# Patient Record
Sex: FEMALE | Race: OTHER | NOT HISPANIC OR LATINO | Employment: FULL TIME | URBAN - METROPOLITAN AREA
[De-identification: names, ages, dates, MRNs, and addresses within clinical notes are randomized per-mention and may not be internally consistent; named-entity substitution may affect disease eponyms.]

---

## 2019-03-20 DIAGNOSIS — S63.521D SPRAIN OF RADIOCARPAL JOINT OF RIGHT WRIST, SUBSEQUENT ENCOUNTER: ICD-10-CM

## 2019-03-20 DIAGNOSIS — S63.522D SPRAIN OF RADIOCARPAL JOINT OF LEFT WRIST, SUBSEQUENT ENCOUNTER: Primary | ICD-10-CM

## 2019-04-03 ENCOUNTER — TELEPHONE (OUTPATIENT)
Dept: RADIOLOGY | Facility: HOSPITAL | Age: 49
End: 2019-04-03

## 2019-04-03 ENCOUNTER — INITIAL CONSULT (OUTPATIENT)
Dept: OTOLARYNGOLOGY | Facility: CLINIC | Age: 49
End: 2019-04-03
Payer: OTHER GOVERNMENT

## 2019-04-03 VITALS
TEMPERATURE: 98 F | SYSTOLIC BLOOD PRESSURE: 141 MMHG | HEART RATE: 92 BPM | DIASTOLIC BLOOD PRESSURE: 85 MMHG | WEIGHT: 169.75 LBS

## 2019-04-03 DIAGNOSIS — C73 PAPILLARY THYROID CARCINOMA: ICD-10-CM

## 2019-04-03 PROCEDURE — 99999 PR PBB SHADOW E&M-EST. PATIENT-LVL III: CPT | Mod: PBBFAC,,, | Performed by: OTOLARYNGOLOGY

## 2019-04-03 PROCEDURE — 99999 PR PBB SHADOW E&M-EST. PATIENT-LVL III: ICD-10-PCS | Mod: PBBFAC,,, | Performed by: OTOLARYNGOLOGY

## 2019-04-03 PROCEDURE — 99213 OFFICE O/P EST LOW 20 MIN: CPT | Mod: PBBFAC | Performed by: OTOLARYNGOLOGY

## 2019-04-03 PROCEDURE — 99204 OFFICE O/P NEW MOD 45 MIN: CPT | Mod: S$PBB,,, | Performed by: OTOLARYNGOLOGY

## 2019-04-03 PROCEDURE — 99204 PR OFFICE/OUTPT VISIT, NEW, LEVL IV, 45-59 MIN: ICD-10-PCS | Mod: S$PBB,,, | Performed by: OTOLARYNGOLOGY

## 2019-04-03 RX ORDER — ESTRADIOL 1 MG/1
0.1 TABLET ORAL DAILY
COMMUNITY
End: 2019-08-15

## 2019-04-03 RX ORDER — METOPROLOL SUCCINATE 25 MG/1
25 TABLET, EXTENDED RELEASE ORAL DAILY
COMMUNITY
End: 2019-08-15

## 2019-04-03 RX ORDER — ROSUVASTATIN CALCIUM 10 MG/1
20 TABLET, COATED ORAL DAILY
COMMUNITY

## 2019-04-03 RX ORDER — CALCITRIOL 1 UG/ML
1 SOLUTION ORAL DAILY
COMMUNITY
End: 2019-05-01

## 2019-04-03 RX ORDER — LEVOTHYROXINE SODIUM 125 UG/1
125 TABLET ORAL DAILY
COMMUNITY
End: 2019-05-01

## 2019-04-03 RX ORDER — AMITRIPTYLINE HYDROCHLORIDE 25 MG/1
25 TABLET, FILM COATED ORAL NIGHTLY PRN
COMMUNITY
End: 2019-08-15

## 2019-04-03 RX ORDER — OMEPRAZOLE 20 MG/1
20 CAPSULE, DELAYED RELEASE ORAL DAILY
COMMUNITY

## 2019-04-03 NOTE — PROGRESS NOTES
Head and Neck Surgery Clinic Note    CC: papillary thyroid carcinoma    HPI: Rosita Romano is a 48 y.o. female presenting with a history of total thyroidectomy for a multifocal T1N0M0 with negative margins. She was recommended to undergo HUNG due to multifocal disease and is here for a second opinion regarding the risks and benefits of this treatment. She is on a stable synthroid dose now as well as calcium supplementation. She denies voice changes, new neck masses or hypo- or hyperthyroid symptoms. She does have a family member with a history of pancreatic carcinoma, but has undergone genetic workup and does not have MEN syndrome.    History reviewed. No pertinent past medical history.    History reviewed. No pertinent surgical history.      Current Outpatient Medications:     amitriptyline (ELAVIL) 25 MG tablet, Take 25 mg by mouth nightly as needed for Insomnia., Disp: , Rfl:     calcitriol (ROCALTROL) 1 mcg/mL solution, Take 1 mcg by mouth once daily., Disp: , Rfl:     estradiol (ESTRACE) 1 MG tablet, Take 0.1 mg by mouth once daily., Disp: , Rfl:     levothyroxine (SYNTHROID) 125 MCG tablet, Take 125 mcg by mouth once daily., Disp: , Rfl:     metoprolol succinate (TOPROL-XL) 25 MG 24 hr tablet, Take 25 mg by mouth once daily., Disp: , Rfl:     omeprazole (PRILOSEC) 20 MG capsule, Take 20 mg by mouth once daily., Disp: , Rfl:     rosuvastatin (CRESTOR) 10 MG tablet, Take 10 mg by mouth once daily., Disp: , Rfl:     Review of patient's allergies indicates:   Allergen Reactions    Bentyl [dicyclomine] Shortness Of Breath    Iodinated contrast- oral and iv dye Palpitations       History reviewed. No pertinent family history.    Social History     Socioeconomic History    Marital status:      Spouse name: Not on file    Number of children: Not on file    Years of education: Not on file    Highest education level: Not on file   Occupational History    Not on file   Social Needs    Financial  resource strain: Not on file    Food insecurity:     Worry: Not on file     Inability: Not on file    Transportation needs:     Medical: Not on file     Non-medical: Not on file   Tobacco Use    Smoking status: Never Smoker    Smokeless tobacco: Never Used   Substance and Sexual Activity    Alcohol use: Not on file    Drug use: Not on file    Sexual activity: Not on file   Lifestyle    Physical activity:     Days per week: Not on file     Minutes per session: Not on file    Stress: Not on file   Relationships    Social connections:     Talks on phone: Not on file     Gets together: Not on file     Attends [employer] service: Not on file     Active member of club or organization: Not on file     Attends meetings of clubs or organizations: Not on file     Relationship status: Not on file   Other Topics Concern    Not on file   Social History Narrative    Not on file       Review of Systems -  Constitutional: Denies having night sweats, constant fatigue, loss of appetite or recent substantial weight loss.  Eyes: Denies blurred vision or double vision.  Respiratory: Denies symptoms of shortness of breath, noisy breathing, hoarseness or chronic cough.  GI: Denies symptoms of heartburn, acid regurgitation, or the known presence of a hiatal hernia.  The remainder of a 10-point review of systems is negative    PERSONAL REVIEW OF RADIOLOGICAL FILMS AND RECORDS:  Outside pathology reviewed - [id number] multifocal papillary thyroid ca    PERSONAL REVIEW OF LABORATORY VALUES:  Reviewed    PHYSICAL EXAM:  Vitals - BP (!) 141/85   Pulse 92   Temp 98.1 °F (36.7 °C)   Wt 77 kg (169 lb 12.1 oz)   Constitutional -      General Appearance: well developed, well nourished, without obvious deformities     Communication: speaks with a normal voice without hoarseness  Head & Face -     Overall: no obvious scars, lesions or masses     Parotid and submandibular glands: no masses or tenderness     Facial strength: normal and equal  bilaterally  Eyes -      EOM intact  Ear, Nose, Mouth & Throat -     Ears: both left and right external auditory canals and TM's are normal, no external deformities     Nasal exam: mucosa is pink, septum is midline, visible turbinates are normal on anterior rhinoscopy     Mastication: teeth appear in good repair     Oral Cavity and oropharynx: mucosa, hard and soft palates, tongue, posterior pharyngeal wall, lips and gums are without lesions. Tonsils appear minimal  Respiratory:     Breathing unlabored, no stridor  Cardiovascular:     No JVD, capillary refill normal  Larynx: using the mirror for indirect laryngoscopy, the epiglottic, false cords, true cords, and pyriform sinuses are without lesions and the true vocal cords move normally  Neck: appears symmetric, and on palpation is without masses   Endocrine:     Thyroid: no asymmetry, thyromegaly, or thyroid nodules on palpation. Thyroid surgically absent.  Lymphatic:     No cervical lymphadenopathy  Cranial Nerves:      II: Pupillary reflexes normal     III, IV, VI: EOM normal     V: 1,2,3: normal sensation     VII: Normal strength in all divisions     IX, X: Normal voice, palatal elevation and sensation     XI: Shoulder strength normal       XII: Tongue mobility normal  Psychiatric:     Appropriate affect    ASSESSMENT: [id number] multifocal papillary thyroid ca    PLAN: We discussed the indications, risks and benefits of HUNG as adjunct therapy. The reason it was recommended was due to the multifocality of disease as well as facilitating thyroglobulin surveillance. For these reasons I would recommend she proceed with this. She agrees, and we were able to get all of her questions answered.       John Munoz

## 2019-04-04 ENCOUNTER — HOSPITAL ENCOUNTER (OUTPATIENT)
Dept: RADIOLOGY | Facility: HOSPITAL | Age: 49
Discharge: HOME | End: 2019-04-04
Attending: ORTHOPAEDIC SURGERY
Payer: OTHER GOVERNMENT

## 2019-04-04 DIAGNOSIS — S63.521D SPRAIN OF RADIOCARPAL JOINT OF RIGHT WRIST, SUBSEQUENT ENCOUNTER: ICD-10-CM

## 2019-04-04 DIAGNOSIS — S63.522D SPRAIN OF RADIOCARPAL JOINT OF LEFT WRIST, SUBSEQUENT ENCOUNTER: ICD-10-CM

## 2019-04-04 PROCEDURE — 73222 MRI JOINT UPR EXTREM W/DYE: CPT | Mod: 26,RT,, | Performed by: RADIOLOGY

## 2019-04-04 PROCEDURE — 25000003 PHARM REV CODE 250: Performed by: ORTHOPAEDIC SURGERY

## 2019-04-04 PROCEDURE — 25246 XR ARTHROGRAM WRIST LEFT WITH MRI TO FOLLOW: ICD-10-PCS | Mod: ,,, | Performed by: RADIOLOGY

## 2019-04-04 PROCEDURE — 73115 CONTRAST X-RAY OF WRIST: CPT | Mod: 26,LT,, | Performed by: RADIOLOGY

## 2019-04-04 PROCEDURE — 25246 INJECTION FOR WRIST X-RAY: CPT | Mod: ,,, | Performed by: RADIOLOGY

## 2019-04-04 PROCEDURE — A9585 GADOBUTROL INJECTION: HCPCS | Performed by: ORTHOPAEDIC SURGERY

## 2019-04-04 PROCEDURE — 25246 XR ARTHROGRAM WRIST RIGHT WITH MRI TO FOLLOW: ICD-10-PCS | Mod: ,,, | Performed by: RADIOLOGY

## 2019-04-04 PROCEDURE — 73222 MRI ARTHROGRAM WRIST W/ CONTRAST LEFT: ICD-10-PCS | Mod: 26,LT,, | Performed by: RADIOLOGY

## 2019-04-04 PROCEDURE — 73115 CONTRAST X-RAY OF WRIST: CPT | Mod: 26,RT,, | Performed by: RADIOLOGY

## 2019-04-04 PROCEDURE — 25500020 PHARM REV CODE 255: Performed by: ORTHOPAEDIC SURGERY

## 2019-04-04 PROCEDURE — 73222 MRI ARTHROGRAM WRIST W/ CONTRAST RIGHT: ICD-10-PCS | Mod: 26,RT,, | Performed by: RADIOLOGY

## 2019-04-04 PROCEDURE — 73222 MRI JOINT UPR EXTREM W/DYE: CPT | Mod: TC,RT

## 2019-04-04 PROCEDURE — 73115 XR ARTHROGRAM WRIST RIGHT WITH MRI TO FOLLOW: ICD-10-PCS | Mod: 26,RT,, | Performed by: RADIOLOGY

## 2019-04-04 PROCEDURE — 73222 MRI JOINT UPR EXTREM W/DYE: CPT | Mod: 26,LT,, | Performed by: RADIOLOGY

## 2019-04-04 PROCEDURE — 73115 XR ARTHROGRAM WRIST LEFT WITH MRI TO FOLLOW: ICD-10-PCS | Mod: 26,LT,, | Performed by: RADIOLOGY

## 2019-04-04 PROCEDURE — 73115 CONTRAST X-RAY OF WRIST: CPT | Mod: TC,LT

## 2019-04-04 PROCEDURE — 73222 MRI JOINT UPR EXTREM W/DYE: CPT | Mod: TC,LT

## 2019-04-04 PROCEDURE — 73115 CONTRAST X-RAY OF WRIST: CPT | Mod: TC,RT

## 2019-04-04 RX ORDER — GADOBUTROL 604.72 MG/ML
2.5 INJECTION INTRAVENOUS
Status: COMPLETED | OUTPATIENT
Start: 2019-04-04 | End: 2019-04-04

## 2019-04-04 RX ORDER — GADOBUTROL 604.72 MG/ML
1.5 INJECTION INTRAVENOUS
Status: COMPLETED | OUTPATIENT
Start: 2019-04-04 | End: 2019-04-04

## 2019-04-04 RX ORDER — LIDOCAINE HYDROCHLORIDE 10 MG/ML
1 INJECTION INFILTRATION; PERINEURAL ONCE
Status: COMPLETED | OUTPATIENT
Start: 2019-04-04 | End: 2019-04-04

## 2019-04-04 RX ADMIN — IOHEXOL 3 ML: 300 INJECTION, SOLUTION INTRAVENOUS at 10:04

## 2019-04-04 RX ADMIN — GADOBUTROL 2.5 ML: 604.72 INJECTION INTRAVENOUS at 10:04

## 2019-04-04 RX ADMIN — LIDOCAINE HYDROCHLORIDE 1 ML: 10 INJECTION, SOLUTION INFILTRATION; PERINEURAL at 10:04

## 2019-04-04 RX ADMIN — IOHEXOL 1.5 ML: 300 INJECTION, SOLUTION INTRAVENOUS at 10:04

## 2019-04-04 RX ADMIN — GADOBUTROL 1.5 ML: 604.72 INJECTION INTRAVENOUS at 10:04

## 2019-04-16 ENCOUNTER — OFFICE VISIT (OUTPATIENT)
Dept: OTOLARYNGOLOGY | Facility: CLINIC | Age: 49
End: 2019-04-16
Payer: OTHER GOVERNMENT

## 2019-04-16 VITALS — WEIGHT: 171.31 LBS | SYSTOLIC BLOOD PRESSURE: 134 MMHG | DIASTOLIC BLOOD PRESSURE: 77 MMHG | HEART RATE: 97 BPM

## 2019-04-16 DIAGNOSIS — C73 MALIGNANT NEOPLASM OF THYROID GLAND: Primary | ICD-10-CM

## 2019-04-16 PROCEDURE — 99999 PR PBB SHADOW E&M-EST. PATIENT-LVL III: ICD-10-PCS | Mod: PBBFAC,,, | Performed by: OTOLARYNGOLOGY

## 2019-04-16 PROCEDURE — 99999 PR PBB SHADOW E&M-EST. PATIENT-LVL III: CPT | Mod: PBBFAC,,, | Performed by: OTOLARYNGOLOGY

## 2019-04-16 PROCEDURE — 99213 OFFICE O/P EST LOW 20 MIN: CPT | Mod: PBBFAC | Performed by: OTOLARYNGOLOGY

## 2019-04-16 PROCEDURE — 99213 PR OFFICE/OUTPT VISIT, EST, LEVL III, 20-29 MIN: ICD-10-PCS | Mod: S$PBB,,, | Performed by: OTOLARYNGOLOGY

## 2019-04-16 PROCEDURE — 99213 OFFICE O/P EST LOW 20 MIN: CPT | Mod: S$PBB,,, | Performed by: OTOLARYNGOLOGY

## 2019-04-16 NOTE — PROGRESS NOTES
Head & Neck Surgery Follow-Up    Treatment History:  1) Total thyroidectomy, [facility], [id number] multifocal PTC - 03/2019    Interval history:  She has decided to undergo HUNG and was set to be treated but the endocrinologist at [facility] has left. She is seeking to establish care with endocrine here. She is still adjusting her Synthroid dose, will begin 112mcg QD starting tomorrow. She is still taking 2g calcium TID and rocaltrol BID.    Exam:  Incision C/D/I, well-healed  No palpable thyroid or LAD  Chvostek neg    Labs: Recent TSH low    A: Generally healign well, still adjusting optimal synthroid dose    P: Continue 112 mcg synthroid until seen by Endocrine. I will place endocrinology referral here.

## 2019-04-17 ENCOUNTER — TELEPHONE (OUTPATIENT)
Dept: ENDOCRINOLOGY | Facility: CLINIC | Age: 49
End: 2019-04-17

## 2019-04-17 NOTE — TELEPHONE ENCOUNTER
----- Message from Shannon Russell NP sent at 4/17/2019  7:29 AM CDT -----  Yes no problem! Thank you!   Radha, can we get her in? You can overbook me.    Thank you,   Shannon     ----- Message -----  From: John Munoz MD  Sent: 4/16/2019   4:06 PM  To: ELIE Crowe,    This patient was treated over in [place] with total thyroidectomy, found to have multifocal disease, and referred for HUNG. She was set to be treated at [facility], however their endocrinologist is leaving and she is transferring her care here. Can we perhaps expedite getting her in to be seen to establish care and for HUNG?    THanks,  John Munoz

## 2019-04-22 ENCOUNTER — TELEPHONE (OUTPATIENT)
Dept: ENDOCRINOLOGY | Facility: CLINIC | Age: 49
End: 2019-04-22

## 2019-04-30 NOTE — PROGRESS NOTES
Subjective:      Patient ID: Rosita Romano is a 49 y.o. female.    Chief Complaint:  Thyroid Problem (New patient )    History of Present Illness  Rosita Romano presents today for evaluation & management of thyroid cancer. This is her first visit with me.     Referred by Dr. John Munoz.   Total thyroidectomy, [facility], [id number] multifocal PTC - 03/2019 (see path report in media tab)     Interval history:  She was told undergo HUNG and was set to be treated but the endocrinologist at [facility] has left. She is seeking to establish care with endocrine here. She is still adjusting her Synthroid dose, will begin 112mcg QD starting tomorrow. She is still taking 2g calcium TID and rocaltrol 0.5 mcg BID. Her parathyroid glands were destroyed in surgery    Vit D 1000 iu daily     Outside Labs:  4/1/19 TG <0.1  TSH 0.036  Free T4 1.29  PTH <6.3  Vit D 25    With regards to her hypothyroidism:    Current medication:   generic lt4 112 mcg daily (she started this in 4/2019) repeat TSH was 0.4.     Takes thyroid medication properly without food first thing in the morning     She is still having occ palpitations & anxiety.     She was taking 125 mcg and was extremely hyperthyroid & symptomatic. Was decreased to 112 mcg daily, her last TSH was 0.4.      Last BMD dated    She has always been on 0.5 mcg daily calcitriol & 2000 mg Ca Carb TID and serum calcium was 8.1 (1647) last month, this month was 8.8 (0744). Urine calcium was collected and her urine calcium is 465.76 with a urine creatinine of 118.   She will occ have a tingle in her lip. No numbness or tingling in hands or feet.     Review of Systems   Constitutional: Positive for fatigue. Negative for unexpected weight change.   Eyes: Negative for visual disturbance.   Respiratory: Negative for cough and shortness of breath.    Cardiovascular: Negative for chest pain.   Gastrointestinal: Negative for abdominal pain.   Endocrine: Negative for cold intolerance, heat intolerance,  polydipsia, polyphagia and polyuria.   Musculoskeletal: Negative for arthralgias.   Skin: Negative for wound.   Neurological: Negative for headaches.   Hematological: Does not bruise/bleed easily.   Psychiatric/Behavioral: Negative for sleep disturbance. The patient is nervous/anxious.      Objective:   Physical Exam   Constitutional: She appears well-developed.   HENT:   Right Ear: External ear normal.   Left Ear: External ear normal.   Nose: Nose normal.   Hearing Normal     Neck: No tracheal deviation present.   Cardiovascular: Normal rate.   No murmur heard.  No edema present   Pulmonary/Chest: Effort normal and breath sounds normal.   Abdominal: Soft. She exhibits no mass. No hernia.   Neurological: She is alert. No cranial nerve deficit or sensory deficit.   Skin: No rash noted.   Psychiatric: She has a normal mood and affect. Judgment normal.   Vitals reviewed.  No palpable thyroid tissue  Scar well healed     Body mass index is 27.51 kg/m².    Lab Review:   No results found for: HGBA1C  No results found for: CHOL, HDL, LDLCALC, TRIG, CHOLHDL  No results found for: NA, K, CL, CO2, GLU, BUN, CREATININE, CALCIUM, PROT, ALBUMIN, BILITOT, ALKPHOS, AST, ALT, ANIONGAP, ESTGFRAFRICA, EGFRNONAA, TSH    Assessment and Plan     1. Papillary thyroid carcinoma  Renal function panel    Calcium, ionized    TSH    Vitamin D    Renal function panel    Calcium, ionized    TSH    Vitamin D    TSH    Thyroglobulin    Renal function panel    Calcium, ionized    Calcium, Timed Urine    Creatinine, urine, timed 24 Hours    Vitamin D    TSH    Thyroglobulin    Renal function panel    Calcium, ionized    Calcium, Timed Urine    Creatinine, urine, timed 24 Hours    Vitamin D   2. Postoperative hypothyroidism  Renal function panel    Calcium, ionized    TSH    Vitamin D    Renal function panel    Calcium, ionized    TSH    Vitamin D    TSH    Thyroglobulin    Renal function panel    Calcium, ionized    Calcium, Timed Urine     Creatinine, urine, timed 24 Hours    Vitamin D    TSH    Thyroglobulin    Renal function panel    Calcium, ionized    Calcium, Timed Urine    Creatinine, urine, timed 24 Hours    Vitamin D   3. Hypocalcemia  hydroCHLOROthiazide (HYDRODIURIL) 12.5 MG Tab    Renal function panel    Calcium, ionized    TSH    Vitamin D    Renal function panel    Calcium, ionized    TSH    Vitamin D    TSH    Thyroglobulin    Renal function panel    Calcium, ionized    Calcium, Timed Urine    Creatinine, urine, timed 24 Hours    Vitamin D    TSH    Thyroglobulin    Renal function panel    Calcium, ionized    Calcium, Timed Urine    Creatinine, urine, timed 24 Hours    Vitamin D   4. Hypercalciuria  hydroCHLOROthiazide (HYDRODIURIL) 12.5 MG Tab    Renal function panel    Calcium, ionized    TSH    Vitamin D    Renal function panel    Calcium, ionized    TSH    Vitamin D    TSH    Thyroglobulin    Renal function panel    Calcium, ionized    Calcium, Timed Urine    Creatinine, urine, timed 24 Hours    Vitamin D    TSH    Thyroglobulin    Renal function panel    Calcium, ionized    Calcium, Timed Urine    Creatinine, urine, timed 24 Hours    Vitamin D   5. Other hypoparathyroidism       Papillary thyroid carcinoma  Reviewed pathology report with Dr. Aguilar, she has low risk disease based on report.  -- discussed i131 with pt, who does not want i131, and Dr. Aguilar and since it is low risk disease she does not require i131 treatment.   -- thyroglobulin has been undetectable since surgery. Recheck in  3 months.  -- TSH goal 2 or less. Low normal.     Postoperative hypothyroidism  -- Goal is a low normal TSH, 2 or less   -- continue 112 mcg daily, recheck in 1 month.    -- Avoid exogenous hyperthyroidism as this can accelerate bone loss and increase risk of CV complications.  -- Advised to take LT4 on an empty stomach with water and to wait 30-45 minutes before eating or taking other medications   -- Reviewed usual  times of thyroid hormone   changes- call if start/ stop ocps, weight changes, attempting conception and during pregnancy   -- Reviewed that symptoms of hypothyroidism may not correlate with tsh, and a normal TSH is the goal of therapy.....  symptoms are not a justification for over treatment    Hypocalcemia  -- continue current calcium supplement & calcitriol.  -- due to 8.8 serum calcium & hypercalciuria start HCTZ 12.5 mg daily.  -- Encouraged patient to take BP medication every morning and to monitor BP at home.  Notify PCP if SBP <100.  -- encouraged patient to notify me if she develops hypocalcemia symptoms.     -- renal panel, ionized calcium in 1 month.     -- renal panel, ionized calcium & urine [facility] [employer] [facility] in 3 months.       Hypercalciuria  See above    Other hypoparathyroidism  See above     Follow up in about 4 months (around 9/1/2019).    Case discussed with Dr. Aguilar   Recommendations were discussed with the patient in detail  The patient verbalized understanding and agrees with the plan outlined as above.

## 2019-05-01 ENCOUNTER — OFFICE VISIT (OUTPATIENT)
Dept: ENDOCRINOLOGY | Facility: CLINIC | Age: 49
End: 2019-05-01
Payer: OTHER GOVERNMENT

## 2019-05-01 VITALS
SYSTOLIC BLOOD PRESSURE: 122 MMHG | BODY MASS INDEX: 27.39 KG/M2 | HEIGHT: 66 IN | WEIGHT: 170.44 LBS | DIASTOLIC BLOOD PRESSURE: 84 MMHG | HEART RATE: 82 BPM

## 2019-05-01 DIAGNOSIS — E20.89 OTHER HYPOPARATHYROIDISM: ICD-10-CM

## 2019-05-01 DIAGNOSIS — E83.51 HYPOCALCEMIA: ICD-10-CM

## 2019-05-01 DIAGNOSIS — C73 PAPILLARY THYROID CARCINOMA: Primary | ICD-10-CM

## 2019-05-01 DIAGNOSIS — R82.994 HYPERCALCIURIA: ICD-10-CM

## 2019-05-01 DIAGNOSIS — E89.0 POSTOPERATIVE HYPOTHYROIDISM: ICD-10-CM

## 2019-05-01 PROCEDURE — 99999 PR PBB SHADOW E&M-EST. PATIENT-LVL III: ICD-10-PCS | Mod: PBBFAC,,, | Performed by: NURSE PRACTITIONER

## 2019-05-01 PROCEDURE — 99204 OFFICE O/P NEW MOD 45 MIN: CPT | Mod: S$PBB,,, | Performed by: NURSE PRACTITIONER

## 2019-05-01 PROCEDURE — 99999 PR PBB SHADOW E&M-EST. PATIENT-LVL III: CPT | Mod: PBBFAC,,, | Performed by: NURSE PRACTITIONER

## 2019-05-01 PROCEDURE — 99213 OFFICE O/P EST LOW 20 MIN: CPT | Mod: PBBFAC | Performed by: NURSE PRACTITIONER

## 2019-05-01 PROCEDURE — 99204 PR OFFICE/OUTPT VISIT, NEW, LEVL IV, 45-59 MIN: ICD-10-PCS | Mod: S$PBB,,, | Performed by: NURSE PRACTITIONER

## 2019-05-01 RX ORDER — CALCITRIOL 0.25 UG/1
0.25 CAPSULE ORAL 2 TIMES DAILY
COMMUNITY
Start: 2019-04-10 | End: 2019-08-06

## 2019-05-01 RX ORDER — LEVOTHYROXINE SODIUM 112 UG/1
TABLET ORAL
COMMUNITY
Start: 2019-04-15 | End: 2019-06-07 | Stop reason: ALTCHOICE

## 2019-05-01 RX ORDER — ESTRADIOL 0.1 MG/D
FILM, EXTENDED RELEASE TRANSDERMAL
COMMUNITY
Start: 2019-03-18 | End: 2021-04-20

## 2019-05-01 RX ORDER — HYDROCHLOROTHIAZIDE 12.5 MG/1
12.5 TABLET ORAL DAILY
Qty: 30 TABLET | Refills: 11 | Status: SHIPPED | OUTPATIENT
Start: 2019-05-01 | End: 2019-08-06

## 2019-05-01 RX ORDER — HYDROXYZINE PAMOATE 25 MG/1
CAPSULE ORAL
Refills: 2 | COMMUNITY
Start: 2019-04-26 | End: 2019-08-15

## 2019-05-01 NOTE — ASSESSMENT & PLAN NOTE
Reviewed pathology report with Dr. Aguilar, she has low risk disease based on report.  -- discussed i131 with pt, who does not want i131, and Dr. Aguilar and since it is low risk disease she does not require i131 treatment.   -- thyroglobulin has been undetectable since surgery. Recheck in  3 months.  -- TSH goal 2 or less. Low normal.

## 2019-05-01 NOTE — LETTER
May 1, 2019      John Munoz MD  [address]  [facility] [address]           [facility] - Endocrinology  [address]  [facility] [address]  Phone: [phone]          Patient: Rosita Romano   MR Number: [id number]   YOB: 1970   Date of Visit: 5/1/2019       Dear Dr. John Munoz:    Thank you for referring Rosita Romano to me for evaluation. Attached you will find relevant portions of my assessment and plan of care.    If you have questions, please do not hesitate to call me. I look forward to following Rosita Romano along with you.    Sincerely,    Shannon Russell, ELIE    Enclosure  CC:  No Recipients    If you would like to receive this communication electronically, please contact [email or web address] or [phone] to request more information on [facility] Link access.    For providers and/or their staff who would like to refer a patient to [facility], please contact us through our one-stop-shop provider referral line, Sowmya Flores, at [phone].    If you feel you have received this communication in error or would no longer like to receive these types of communications, please e-mail [email or web address]

## 2019-05-01 NOTE — ASSESSMENT & PLAN NOTE
-- Goal is a low normal TSH, 2 or less   -- continue 112 mcg daily, recheck in 1 month.    -- Avoid exogenous hyperthyroidism as this can accelerate bone loss and increase risk of CV complications.  -- Advised to take LT4 on an empty stomach with water and to wait 30-45 minutes before eating or taking other medications   -- Reviewed usual  times of thyroid hormone  changes- call if start/ stop ocps, weight changes, attempting conception and during pregnancy   -- Reviewed that symptoms of hypothyroidism may not correlate with tsh, and a normal TSH is the goal of therapy.....  symptoms are not a justification for over treatment

## 2019-05-01 NOTE — ASSESSMENT & PLAN NOTE
-- continue current calcium supplement & calcitriol.  -- due to 8.8 serum calcium & hypercalciuria start HCTZ 12.5 mg daily.  -- Encouraged patient to take BP medication every morning and to monitor BP at home.  Notify PCP if SBP <100.  -- encouraged patient to notify me if she develops hypocalcemia symptoms.     -- renal panel, ionized calcium in 1 month.     -- renal panel, ionized calcium & urine [facility] Cr in 3 months.

## 2019-05-01 NOTE — PATIENT INSTRUCTIONS
Hydrochlorothiazide, HCTZ capsules or tablets  What is this medicine?  HYDROCHLOROTHIAZIDE (amber droe klor oh THYE a zide) is a diuretic. It increases the amount of urine passed, which causes the body to lose salt and water. This medicine is used to treat high blood pressure. It is also reduces the swelling and water retention caused by various medical conditions, such as heart, liver, or kidney disease.  How should I use this medicine?  Take this medicine by mouth with a glass of water. Follow the directions on the prescription label. Take your medicine at regular intervals. Remember that you will need to pass urine frequently after taking this medicine. Do not take your doses at a time of day that will cause you problems. Do not stop taking your medicine unless your doctor tells you to.  Talk to your pediatrician regarding the use of this medicine in children. Special care may be needed.  What side effects may I notice from receiving this medicine?  Side effects that you should report to your doctor or health care professional as soon as possible:  · allergic reactions such as skin rash or itching, hives, swelling of the lips, mouth, tongue, or throat  · changes in vision  · chest pain  · eye pain  · fast or irregular heartbeat  · feeling faint or lightheaded, falls  · gout attack  · muscle pain or cramps  · pain or difficulty when passing urine  · pain, tingling, numbness in the hands or feet  · redness, blistering, peeling or loosening of the skin, including inside the mouth  · unusually weak or tired  Side effects that usually do not require medical attention (report to your doctor or health care professional if they continue or are bothersome):  · change in sex drive or performance  · dry mouth  · headache  · stomach upset  What may interact with this medicine?  · cholestyramine  · colestipol  · digoxin  · dofetilide  · lithium  · medicines for blood pressure  · medicines for diabetes  · medicines that relax  muscles for surgery  · other diuretics  · steroid medicines like prednisone or cortisone  What if I miss a dose?  If you miss a dose, take it as soon as you can. If it is almost time for your next dose, take only that dose. Do not take double or extra doses.  Where should I keep my medicine?  Keep out of the reach of children.  Store at room temperature between 15 and 30 degrees C (59 and 86 degrees F). Do not freeze. Protect from light and moisture. Keep container closed tightly. Throw away any unused medicine after the expiration date.  What should I tell my health care provider before I take this medicine?  They need to know if you have any of these conditions:  · diabetes  · gout  · immune system problems, like lupus  · kidney disease or kidney stones  · liver disease  · pancreatitis  · small amount of urine or difficulty passing urine  · an unusual or allergic reaction to hydrochlorothiazide, sulfa drugs, other medicines, foods, dyes, or preservatives  · pregnant or trying to get pregnant  · breast-feeding  What should I watch for while using this medicine?  Visit your doctor or health care professional for regular checks on your progress. Check your blood pressure as directed. Ask your doctor or health care professional what your blood pressure should be and when you should contact him or her.  You may need to be on a special diet while taking this medicine. Ask your doctor.  Check with your doctor or health care professional if you get an attack of severe diarrhea, nausea and vomiting, or if you sweat a lot. The loss of too much body fluid can make it dangerous for you to take this medicine.  You may get drowsy or dizzy. Do not drive, use machinery, or do anything that needs mental alertness until you know how this medicine affects you. Do not stand or sit up quickly, especially if you are an older patient. This reduces the risk of dizzy or fainting spells. Alcohol may interfere with the effect of this  medicine. Avoid alcoholic drinks.  This medicine may affect your blood sugar level. If you have diabetes, check with your doctor or health care professional before changing the dose of your diabetic medicine.  This medicine can make you more sensitive to the sun. Keep out of the sun. If you cannot avoid being in the sun, wear protective clothing and use sunscreen. Do not use sun lamps or tanning beds/booths.  NOTE:This sheet is a summary. It may not cover all possible information. If you have questions about this medicine, talk to your doctor, pharmacist, or health care provider. Copyright© 2017 [employer]

## 2019-05-16 ENCOUNTER — TELEPHONE (OUTPATIENT)
Dept: ENDOCRINOLOGY | Facility: CLINIC | Age: 49
End: 2019-05-16

## 2019-05-16 NOTE — TELEPHONE ENCOUNTER
----- Message from Syeda Gant sent at 5/16/2019 10:59 AM CDT -----  Contact:       [place]  [phone]  Ext [phone]  Needs Advice  / [facility]     Reason for call:  Following up on a request for May 1 visit office notes being faxed over         Communication Preference:   Phone     Additional Information:    Fax number  [phone]

## 2019-06-04 ENCOUNTER — TELEPHONE (OUTPATIENT)
Dept: ENDOCRINOLOGY | Facility: CLINIC | Age: 49
End: 2019-06-04

## 2019-06-04 NOTE — TELEPHONE ENCOUNTER
Spoke with pt and received contact no of lab she had done that can call to get pt  lab result done yesterday. Contact no for lab is [phone].     Pt also need work excuse . Will axing over to clinic.

## 2019-06-04 NOTE — TELEPHONE ENCOUNTER
Called [facility] and the [facility] to fax over pt lab result. All lab are resulted  except Calcium Ionized .. Lab shipped it to test done. Lab will faxing over other lab result. Clinic fax no was provided.

## 2019-06-04 NOTE — TELEPHONE ENCOUNTER
----- Message from Valery Mckinley sent at 6/4/2019  8:31 AM CDT -----  Contact: Self [phone]   PT is requesting lab results and to discuss her return to work.

## 2019-06-05 ENCOUNTER — TELEPHONE (OUTPATIENT)
Dept: ENDOCRINOLOGY | Facility: CLINIC | Age: 49
End: 2019-06-05

## 2019-06-05 NOTE — TELEPHONE ENCOUNTER
----- Message from Valery Mckinley sent at 6/5/2019  4:43 PM CDT -----  Contact: Self [phone]   PT called to verify if lab results were received. She was informed that her TSH is still low.

## 2019-06-07 ENCOUNTER — TELEPHONE (OUTPATIENT)
Dept: ENDOCRINOLOGY | Facility: CLINIC | Age: 49
End: 2019-06-07

## 2019-06-07 ENCOUNTER — PATIENT MESSAGE (OUTPATIENT)
Dept: ENDOCRINOLOGY | Facility: CLINIC | Age: 49
End: 2019-06-07

## 2019-06-07 DIAGNOSIS — E89.0 POSTOPERATIVE HYPOTHYROIDISM: Primary | ICD-10-CM

## 2019-06-07 RX ORDER — LEVOTHYROXINE SODIUM 100 UG/1
100 TABLET ORAL DAILY
Qty: 30 TABLET | Refills: 11 | Status: SHIPPED | OUTPATIENT
Start: 2019-06-07 | End: 2019-06-10 | Stop reason: SDUPTHER

## 2019-06-10 ENCOUNTER — PATIENT MESSAGE (OUTPATIENT)
Dept: ENDOCRINOLOGY | Facility: CLINIC | Age: 49
End: 2019-06-10

## 2019-06-10 DIAGNOSIS — E89.0 POSTOPERATIVE HYPOTHYROIDISM: ICD-10-CM

## 2019-06-10 RX ORDER — LEVOTHYROXINE SODIUM 100 UG/1
100 TABLET ORAL DAILY
Qty: 30 TABLET | Refills: 11 | Status: SHIPPED | OUTPATIENT
Start: 2019-06-10 | End: 2019-06-11 | Stop reason: SDUPTHER

## 2019-06-11 DIAGNOSIS — E89.0 POSTOPERATIVE HYPOTHYROIDISM: ICD-10-CM

## 2019-06-11 RX ORDER — LEVOTHYROXINE SODIUM 100 UG/1
100 TABLET ORAL DAILY
Qty: 90 TABLET | Refills: 3 | Status: SHIPPED | OUTPATIENT
Start: 2019-06-11 | End: 2019-06-11 | Stop reason: SDUPTHER

## 2019-06-11 RX ORDER — LEVOTHYROXINE SODIUM 100 UG/1
100 TABLET ORAL DAILY
Qty: 90 TABLET | Refills: 3 | Status: SHIPPED | OUTPATIENT
Start: 2019-06-11 | End: 2019-08-01

## 2019-06-11 NOTE — TELEPHONE ENCOUNTER
Spoke with pt and notified that we [facility] having satellites issues so wont able to sent in electronically or verbally or by fax it has to be written Rx . Please sent Rx to [facility].

## 2019-07-01 ENCOUNTER — PATIENT MESSAGE (OUTPATIENT)
Dept: ENDOCRINOLOGY | Facility: CLINIC | Age: 49
End: 2019-07-01

## 2019-07-03 ENCOUNTER — PATIENT MESSAGE (OUTPATIENT)
Dept: ENDOCRINOLOGY | Facility: CLINIC | Age: 49
End: 2019-07-03

## 2019-07-03 ENCOUNTER — TELEPHONE (OUTPATIENT)
Dept: ENDOCRINOLOGY | Facility: CLINIC | Age: 49
End: 2019-07-03

## 2019-07-03 DIAGNOSIS — C73 PAPILLARY THYROID CARCINOMA: Primary | ICD-10-CM

## 2019-07-03 NOTE — TELEPHONE ENCOUNTER
----- Message from Shannon Russell NP sent at 7/3/2019  9:14 AM CDT -----  Please schedule thyroid ultrasound and alert patient.    Thank you,   Shannon

## 2019-07-08 ENCOUNTER — HOSPITAL ENCOUNTER (OUTPATIENT)
Dept: RADIOLOGY | Facility: HOSPITAL | Age: 49
Discharge: HOME | End: 2019-07-08
Attending: NURSE PRACTITIONER
Payer: OTHER GOVERNMENT

## 2019-07-08 DIAGNOSIS — C73 PAPILLARY THYROID CARCINOMA: ICD-10-CM

## 2019-07-08 PROCEDURE — 76536 US EXAM OF HEAD AND NECK: CPT | Mod: TC

## 2019-07-08 PROCEDURE — 76536 US EXAM OF HEAD AND NECK: CPT | Mod: 26,,, | Performed by: RADIOLOGY

## 2019-07-08 PROCEDURE — 76536 US SOFT TISSUE HEAD NECK THYROID: ICD-10-PCS | Mod: 26,,, | Performed by: RADIOLOGY

## 2019-07-19 ENCOUNTER — PATIENT MESSAGE (OUTPATIENT)
Dept: ENDOCRINOLOGY | Facility: CLINIC | Age: 49
End: 2019-07-19

## 2019-07-22 ENCOUNTER — PATIENT MESSAGE (OUTPATIENT)
Dept: ENDOCRINOLOGY | Facility: CLINIC | Age: 49
End: 2019-07-22

## 2019-07-22 ENCOUNTER — TELEPHONE (OUTPATIENT)
Dept: ENDOCRINOLOGY | Facility: CLINIC | Age: 49
End: 2019-07-22

## 2019-07-22 DIAGNOSIS — E20.89 OTHER HYPOPARATHYROIDISM: Primary | ICD-10-CM

## 2019-07-22 DIAGNOSIS — C73 PAPILLARY THYROID CARCINOMA: ICD-10-CM

## 2019-07-22 DIAGNOSIS — E20.89 OTHER HYPOPARATHYROIDISM: ICD-10-CM

## 2019-07-22 DIAGNOSIS — E83.51 HYPOCALCEMIA: Primary | ICD-10-CM

## 2019-07-22 NOTE — TELEPHONE ENCOUNTER
----- Message from Shannon Russell NP sent at 7/22/2019  2:55 PM CDT -----  Please schedule labs in [facility] and alert patient.    Thank you,   Shannon

## 2019-07-24 ENCOUNTER — PATIENT MESSAGE (OUTPATIENT)
Dept: ENDOCRINOLOGY | Facility: CLINIC | Age: 49
End: 2019-07-24

## 2019-07-29 ENCOUNTER — PATIENT MESSAGE (OUTPATIENT)
Dept: ENDOCRINOLOGY | Facility: CLINIC | Age: 49
End: 2019-07-29

## 2019-07-29 ENCOUNTER — LAB VISIT (OUTPATIENT)
Dept: LAB | Facility: HOSPITAL | Age: 49
End: 2019-07-29
Attending: NURSE PRACTITIONER
Payer: OTHER GOVERNMENT

## 2019-07-29 DIAGNOSIS — E20.89 OTHER HYPOPARATHYROIDISM: ICD-10-CM

## 2019-07-29 DIAGNOSIS — C73 PAPILLARY THYROID CARCINOMA: ICD-10-CM

## 2019-07-29 DIAGNOSIS — E83.51 HYPOCALCEMIA: ICD-10-CM

## 2019-07-29 LAB
25(OH)D3+25(OH)D2 SERPL-MCNC: 41 NG/ML (ref 30–96)
ALBUMIN SERPL BCP-MCNC: 4.2 G/DL (ref 3.5–5.2)
ANION GAP SERPL CALC-SCNC: 9 MMOL/L (ref 8–16)
BUN SERPL-MCNC: 9 MG/DL (ref 6–20)
CA-I BLDV-SCNC: 1.21 MMOL/L (ref 1.06–1.42)
CALCIUM 24H UR-MRATE: 23 MG/HR (ref 4–12)
CALCIUM SERPL-MCNC: 8.8 MG/DL (ref 8.7–10.5)
CALCIUM UR-MCNC: 24.8 MG/DL (ref 0–15)
CALCIUM URINE (MG/SPEC): 546 MG/SPEC
CHLORIDE SERPL-SCNC: 102 MMOL/L (ref 95–110)
CO2 SERPL-SCNC: 26 MMOL/L (ref 23–29)
CREAT 24H UR-MRATE: 68.4 MG/HR (ref 40–75)
CREAT SERPL-MCNC: 1 MG/DL (ref 0.5–1.4)
CREAT UR-MCNC: 74.6 MG/DL (ref 15–325)
CREATININE, URINE (MG/SPEC): 1641.2 MG/SPEC
EST. GFR  (AFRICAN AMERICAN): >60 ML/MIN/1.73 M^2
EST. GFR  (NON AFRICAN AMERICAN): >60 ML/MIN/1.73 M^2
GLUCOSE SERPL-MCNC: 86 MG/DL (ref 70–110)
PHOSPHATE SERPL-MCNC: 3.6 MG/DL (ref 2.7–4.5)
POTASSIUM SERPL-SCNC: 3.6 MMOL/L (ref 3.5–5.1)
PTH-INTACT SERPL-MCNC: <5 PG/ML (ref 9–77)
SODIUM SERPL-SCNC: 137 MMOL/L (ref 136–145)
T4 FREE SERPL-MCNC: 1.25 NG/DL (ref 0.71–1.51)
TSH SERPL DL<=0.005 MIU/L-ACNC: 0.14 UIU/ML (ref 0.34–5.6)
URINE COLLECTION DURATION: 24 HR
URINE COLLECTION DURATION: 24 HR
URINE VOLUME: 2200 ML
URINE VOLUME: 2200 ML

## 2019-07-29 PROCEDURE — 86800 THYROGLOBULIN ANTIBODY: CPT

## 2019-07-29 PROCEDURE — 84443 ASSAY THYROID STIM HORMONE: CPT

## 2019-07-29 PROCEDURE — 80069 RENAL FUNCTION PANEL: CPT

## 2019-07-29 PROCEDURE — 82330 ASSAY OF CALCIUM: CPT

## 2019-07-29 PROCEDURE — 84439 ASSAY OF FREE THYROXINE: CPT

## 2019-07-29 PROCEDURE — 82570 ASSAY OF URINE CREATININE: CPT

## 2019-07-29 PROCEDURE — 83970 ASSAY OF PARATHORMONE: CPT

## 2019-07-29 PROCEDURE — 82306 VITAMIN D 25 HYDROXY: CPT

## 2019-07-29 PROCEDURE — 82340 ASSAY OF CALCIUM IN URINE: CPT

## 2019-07-30 ENCOUNTER — PATIENT MESSAGE (OUTPATIENT)
Dept: ENDOCRINOLOGY | Facility: CLINIC | Age: 49
End: 2019-07-30

## 2019-07-30 ENCOUNTER — TELEPHONE (OUTPATIENT)
Dept: ENDOCRINOLOGY | Facility: CLINIC | Age: 49
End: 2019-07-30

## 2019-07-31 ENCOUNTER — PATIENT MESSAGE (OUTPATIENT)
Dept: ENDOCRINOLOGY | Facility: CLINIC | Age: 49
End: 2019-07-31

## 2019-07-31 ENCOUNTER — TELEPHONE (OUTPATIENT)
Dept: ENDOCRINOLOGY | Facility: CLINIC | Age: 49
End: 2019-07-31

## 2019-07-31 LAB
THRYOGLOBULIN INTERPRETATION: ABNORMAL
THYROGLOB AB SERPL-ACNC: <1.8 IU/ML
THYROGLOB SERPL-MCNC: 0.2 NG/ML

## 2019-08-01 ENCOUNTER — PATIENT MESSAGE (OUTPATIENT)
Dept: ENDOCRINOLOGY | Facility: CLINIC | Age: 49
End: 2019-08-01

## 2019-08-01 ENCOUNTER — TELEPHONE (OUTPATIENT)
Dept: ENDOCRINOLOGY | Facility: CLINIC | Age: 49
End: 2019-08-01

## 2019-08-01 DIAGNOSIS — E89.0 POSTOPERATIVE HYPOTHYROIDISM: Primary | ICD-10-CM

## 2019-08-01 RX ORDER — LEVOTHYROXINE SODIUM 88 UG/1
88 TABLET ORAL DAILY
Qty: 30 TABLET | Refills: 11 | Status: SHIPPED | OUTPATIENT
Start: 2019-08-01 | End: 2019-08-06

## 2019-08-06 ENCOUNTER — TELEPHONE (OUTPATIENT)
Dept: ENDOCRINOLOGY | Facility: CLINIC | Age: 49
End: 2019-08-06

## 2019-08-06 ENCOUNTER — PATIENT MESSAGE (OUTPATIENT)
Dept: ENDOCRINOLOGY | Facility: CLINIC | Age: 49
End: 2019-08-06

## 2019-08-06 DIAGNOSIS — E83.51 HYPOCALCEMIA: Primary | ICD-10-CM

## 2019-08-06 DIAGNOSIS — E89.0 POSTOPERATIVE HYPOTHYROIDISM: ICD-10-CM

## 2019-08-06 RX ORDER — HYDROCHLOROTHIAZIDE 25 MG/1
25 TABLET ORAL DAILY
Qty: 30 TABLET | Refills: 11 | Status: SHIPPED | OUTPATIENT
Start: 2019-08-06 | End: 2020-09-08

## 2019-08-06 RX ORDER — CALCITRIOL 0.5 UG/1
0.5 CAPSULE ORAL 2 TIMES DAILY
Qty: 60 CAPSULE | Refills: 11 | Status: SHIPPED | OUTPATIENT
Start: 2019-08-06 | End: 2020-02-07

## 2019-08-06 RX ORDER — LEVOTHYROXINE SODIUM 75 UG/1
75 TABLET ORAL DAILY
Qty: 30 TABLET | Refills: 11 | Status: SHIPPED | OUTPATIENT
Start: 2019-08-06 | End: 2019-12-30

## 2019-08-15 ENCOUNTER — OFFICE VISIT (OUTPATIENT)
Dept: ENDOCRINOLOGY | Facility: CLINIC | Age: 49
End: 2019-08-15
Payer: OTHER GOVERNMENT

## 2019-08-15 VITALS
BODY MASS INDEX: 26.19 KG/M2 | HEIGHT: 66 IN | DIASTOLIC BLOOD PRESSURE: 80 MMHG | HEART RATE: 88 BPM | WEIGHT: 162.94 LBS | SYSTOLIC BLOOD PRESSURE: 130 MMHG

## 2019-08-15 DIAGNOSIS — E83.51 HYPOCALCEMIA: ICD-10-CM

## 2019-08-15 DIAGNOSIS — C73 PAPILLARY THYROID CARCINOMA: ICD-10-CM

## 2019-08-15 DIAGNOSIS — E89.0 POSTOPERATIVE HYPOTHYROIDISM: Primary | ICD-10-CM

## 2019-08-15 DIAGNOSIS — E20.89 OTHER HYPOPARATHYROIDISM: ICD-10-CM

## 2019-08-15 DIAGNOSIS — R82.994 HYPERCALCIURIA: ICD-10-CM

## 2019-08-15 PROCEDURE — 99999 PR PBB SHADOW E&M-EST. PATIENT-LVL III: CPT | Mod: PBBFAC,,, | Performed by: NURSE PRACTITIONER

## 2019-08-15 PROCEDURE — 99214 PR OFFICE/OUTPT VISIT, EST, LEVL IV, 30-39 MIN: ICD-10-PCS | Mod: S$PBB,,, | Performed by: NURSE PRACTITIONER

## 2019-08-15 PROCEDURE — 99213 OFFICE O/P EST LOW 20 MIN: CPT | Mod: PBBFAC | Performed by: NURSE PRACTITIONER

## 2019-08-15 PROCEDURE — 99214 OFFICE O/P EST MOD 30 MIN: CPT | Mod: S$PBB,,, | Performed by: NURSE PRACTITIONER

## 2019-08-15 PROCEDURE — 99999 PR PBB SHADOW E&M-EST. PATIENT-LVL III: ICD-10-PCS | Mod: PBBFAC,,, | Performed by: NURSE PRACTITIONER

## 2019-08-15 RX ORDER — DICLOFENAC SODIUM 10 MG/G
GEL TOPICAL
COMMUNITY
Start: 2019-08-02 | End: 2019-12-30

## 2019-08-15 RX ORDER — BUSPIRONE HYDROCHLORIDE 5 MG/1
7.5 TABLET ORAL
COMMUNITY
Start: 2019-04-08 | End: 2021-04-20

## 2019-08-15 RX ORDER — CHOLECALCIFEROL (VITAMIN D3) 25 MCG
TABLET ORAL
COMMUNITY
Start: 2018-07-02

## 2019-08-15 NOTE — ASSESSMENT & PLAN NOTE
-- continue current calcium supplement & calcitriol & HCTZ  -- Encouraged patient to take BP medication every morning and to monitor BP at home.  Notify PCP if SBP <100.  -- encouraged patient to notify me if she develops hypocalcemia symptoms.     -- renal panel, ionized calcium today    -- renal panel, ionized calcium in 3 months.

## 2019-08-15 NOTE — ASSESSMENT & PLAN NOTE
-- Goal is a low normal TSH, 2 or less   -- continue 75 mcg daily, recheck today   -- Avoid exogenous hyperthyroidism as this can accelerate bone loss and increase risk of CV complications.  -- Advised to take LT4 on an empty stomach with water and to wait 30-45 minutes before eating or taking other medications   -- Reviewed usual  times of thyroid hormone  changes- call if start/ stop ocps, weight changes, attempting conception and during pregnancy   -- Reviewed that symptoms of hypothyroidism may not correlate with tsh, and a normal TSH is the goal of therapy.....  symptoms are not a justification for over treatment

## 2019-08-15 NOTE — PROGRESS NOTES
Subjective:      Patient ID: Rosita Romano is a 49 y.o. female.    Chief Complaint:  Thyroid Problem and Follow-up    History of Present Illness  Rosita Romano presents today for follow up of thyroid cancer.     Referred by Dr. John Munoz.   Total thyroidectomy, [facility], [id number] multifocal PTC - 05/31/2018 (see path report in media tab)     Interval history:  She was told undergo HUNG and was set to be treated but the endocrinologist at [facility] has left. She is seeking to establish care with endocrine here. She is still adjusting her Synthroid dose, will begin 112mcg QD starting tomorrow. She is still taking 2g calcium TID and rocaltrol 0.5 mcg BID. Her parathyroid glands were destroyed in surgery    Vit D 1000 iu daily     Outside Labs:`4/1/19 TG <0.1  TSH 0.036  Free T4 1.29  PTH <6.3  Vit D 25    With regards to her hypothyroidism:    Current medication:   generic lt4 75 mcg daily    Takes thyroid medication properly without food first thing in the morning     Since we changed her medication:  Occ fatigue, improving HA & Anxiety & palpitations.   Has lost 10 lbs since she saw me    It has been 5 months since her surgery. Plan is to optimize calcium as much as possible due to the fact that PTH may recover within up to 1 year after surgery.     She is currently taking calcitriol 0.5 mcg BID & Ca Carbonate 2000 mg TID (recently increased calcitriol).  Increased HCTZ 25 mg daily as well.    All of her symptoms have improved. She has started drinking coconut water to ensure her K+ is replete after increasing HCTZ. BP stable.    She had been having lethargy & difficulty working, driving, & functioning with symptoms of hypocalcemia.   She will occ have a tingle in her lip.   No numbness or tingling in hands or feet.     Review of Systems   Constitutional: Negative for fatigue.   Eyes: Negative for visual disturbance.   Respiratory: Negative for shortness of breath.    Cardiovascular: Negative for chest pain.    Gastrointestinal: Negative for abdominal pain.   Musculoskeletal: Negative for arthralgias.   Skin: Negative for wound.   Neurological: Negative for headaches.   Hematological: Does not bruise/bleed easily.   Psychiatric/Behavioral: Negative for sleep disturbance.     Objective:   Physical Exam   Constitutional: She appears well-developed.   Neck: No thyromegaly present.   Cardiovascular: Normal rate.   Pulmonary/Chest: Effort normal.   Abdominal: Soft.   Vitals reviewed.  No palpable thyroid tissue  Scar well healed     Body mass index is 26.3 kg/m².    Lab Review:   No results found for: HGBA1C  No results found for: CHOL, HDL, LDLCALC, TRIG, CHOLHDL  Lab Results   Component Value Date     07/29/2019    K 3.6 07/29/2019     07/29/2019    CO2 26 07/29/2019    GLU 86 07/29/2019    BUN 9 07/29/2019    CREATININE 1.0 07/29/2019    CALCIUM 8.8 07/29/2019    ALBUMIN 4.2 07/29/2019    ANIONGAP 9 07/29/2019    ESTGFRAFRICA >60.0 07/29/2019    EGFRNONAA >60.0 07/29/2019    TSH 0.144 (L) 07/29/2019     Assessment and Plan     1. Postoperative hypothyroidism  TSH   2. Papillary thyroid carcinoma  CBC auto differential    Tissue transglutaminase, IgA    Renal function panel    Calcium, ionized    PTH, intact    Calcitriol    US Soft Tissue Head Neck Thyroid    CANCELED: US Soft Tissue Head Neck Thyroid   3. Hypocalcemia  CBC auto differential    Tissue transglutaminase, IgA    Renal function panel    Calcium, ionized    PTH, intact    Calcitriol    Calcium, Timed Urine    Creatinine, urine, timed 24 Hours   4. Hypercalciuria  CBC auto differential    Tissue transglutaminase, IgA    Renal function panel    Calcium, ionized    PTH, intact    Calcitriol   5. Other hypoparathyroidism       Postoperative hypothyroidism  -- Goal is a low normal TSH, 2 or less   -- continue 75 mcg daily, recheck today   -- Avoid exogenous hyperthyroidism as this can accelerate bone loss and increase risk of CV complications.  --  Advised to take LT4 on an empty stomach with water and to wait 30-45 minutes before eating or taking other medications   -- Reviewed usual  times of thyroid hormone  changes- call if start/ stop ocps, weight changes, attempting conception and during pregnancy   -- Reviewed that symptoms of hypothyroidism may not correlate with tsh, and a normal TSH is the goal of therapy.....  symptoms are not a justification for over treatment    Papillary thyroid carcinoma  Reviewed pathology report with Dr. Aguilar, she has low risk disease based on report.  -- discussed i131 with pt, who does not want i131, and Dr. Aguilar and since it is low risk disease she does not require i131 treatment.   -- thyroglobulin has been undetectable since surgery. Recheck in  3 months.  -- TSH goal 2 or less. Low normal.     Hypocalcemia  -- continue current calcium supplement & calcitriol & HCTZ  -- Encouraged patient to take BP medication every morning and to monitor BP at home.  Notify PCP if SBP <100.  -- encouraged patient to notify me if she develops hypocalcemia symptoms.     -- renal panel, ionized calcium today    -- renal panel, ionized calcium in 3 months.       Hypercalciuria  See above    Other hypoparathyroidism  See above     Follow up in about 3 months (around 11/15/2019).    Case discussed with entire staff at Case Conference  Seen with Dr. Salcedo as well   Recommendations were discussed with the patient in detail  The patient verbalized understanding and agrees with the plan outlined as above.

## 2019-08-15 NOTE — ASSESSMENT & PLAN NOTE
Reviewed pathology report with Dr. Aguilar, she has low risk disease based on report.  -- discussed i131 with pt, who does not want i131, and Dr. Aguilar and since it is low risk disease she does not require i131 treatment.   -- thyroglobulin has been undetectable since surgery. Recheck in  3 months.  -- TSH goal 2 or less. Low normal.    Yes

## 2019-08-16 ENCOUNTER — PATIENT MESSAGE (OUTPATIENT)
Dept: ENDOCRINOLOGY | Facility: CLINIC | Age: 49
End: 2019-08-16

## 2019-08-19 ENCOUNTER — PATIENT MESSAGE (OUTPATIENT)
Dept: ENDOCRINOLOGY | Facility: CLINIC | Age: 49
End: 2019-08-19

## 2019-08-22 ENCOUNTER — PATIENT MESSAGE (OUTPATIENT)
Dept: ENDOCRINOLOGY | Facility: CLINIC | Age: 49
End: 2019-08-22

## 2019-08-28 ENCOUNTER — TELEPHONE (OUTPATIENT)
Dept: ENDOCRINOLOGY | Facility: CLINIC | Age: 49
End: 2019-08-28

## 2019-09-02 ENCOUNTER — APPOINTMENT (OUTPATIENT)
Dept: LAB | Facility: HOSPITAL | Age: 49
End: 2019-09-02
Attending: NURSE PRACTITIONER
Payer: OTHER GOVERNMENT

## 2019-09-02 LAB
T4 FREE SERPL-MCNC: 1.34 NG/DL (ref 0.71–1.51)
TSH SERPL DL<=0.005 MIU/L-ACNC: 1.72 UIU/ML (ref 0.34–5.6)

## 2019-09-02 PROCEDURE — 84439 ASSAY OF FREE THYROXINE: CPT

## 2019-09-02 PROCEDURE — 36415 COLL VENOUS BLD VENIPUNCTURE: CPT

## 2019-09-02 PROCEDURE — 84443 ASSAY THYROID STIM HORMONE: CPT

## 2019-09-03 ENCOUNTER — PATIENT MESSAGE (OUTPATIENT)
Dept: ENDOCRINOLOGY | Facility: CLINIC | Age: 49
End: 2019-09-03

## 2019-09-11 ENCOUNTER — PATIENT MESSAGE (OUTPATIENT)
Dept: ENDOCRINOLOGY | Facility: CLINIC | Age: 49
End: 2019-09-11

## 2019-09-11 ENCOUNTER — LAB VISIT (OUTPATIENT)
Dept: LAB | Facility: HOSPITAL | Age: 49
End: 2019-09-11
Payer: OTHER GOVERNMENT

## 2019-09-11 DIAGNOSIS — E89.0 POSTOPERATIVE HYPOTHYROIDISM: ICD-10-CM

## 2019-09-11 DIAGNOSIS — E89.0 POSTOPERATIVE HYPOTHYROIDISM: Primary | ICD-10-CM

## 2019-09-11 LAB
ALBUMIN SERPL BCP-MCNC: 4 G/DL (ref 3.5–5.2)
ANION GAP SERPL CALC-SCNC: 11 MMOL/L (ref 8–16)
BUN SERPL-MCNC: 11 MG/DL (ref 6–20)
CALCIUM SERPL-MCNC: 9.5 MG/DL (ref 8.7–10.5)
CHLORIDE SERPL-SCNC: 101 MMOL/L (ref 95–110)
CO2 SERPL-SCNC: 31 MMOL/L (ref 23–29)
CREAT SERPL-MCNC: 1 MG/DL (ref 0.5–1.4)
EST. GFR  (AFRICAN AMERICAN): >60 ML/MIN/1.73 M^2
EST. GFR  (NON AFRICAN AMERICAN): >60 ML/MIN/1.73 M^2
GLUCOSE SERPL-MCNC: 97 MG/DL (ref 70–110)
PHOSPHATE SERPL-MCNC: 4.6 MG/DL (ref 2.7–4.5)
POTASSIUM SERPL-SCNC: 3.4 MMOL/L (ref 3.5–5.1)
SODIUM SERPL-SCNC: 143 MMOL/L (ref 136–145)
T4 FREE SERPL-MCNC: 1.35 NG/DL (ref 0.71–1.51)
TSH SERPL DL<=0.005 MIU/L-ACNC: 1.12 UIU/ML (ref 0.4–4)

## 2019-09-11 PROCEDURE — 36415 COLL VENOUS BLD VENIPUNCTURE: CPT

## 2019-09-11 PROCEDURE — 80069 RENAL FUNCTION PANEL: CPT

## 2019-09-11 PROCEDURE — 84439 ASSAY OF FREE THYROXINE: CPT

## 2019-09-11 PROCEDURE — 84443 ASSAY THYROID STIM HORMONE: CPT

## 2019-09-12 ENCOUNTER — PATIENT MESSAGE (OUTPATIENT)
Dept: ENDOCRINOLOGY | Facility: CLINIC | Age: 49
End: 2019-09-12

## 2019-09-24 ENCOUNTER — TELEPHONE (OUTPATIENT)
Dept: ENDOCRINOLOGY | Facility: CLINIC | Age: 49
End: 2019-09-24

## 2019-09-24 NOTE — TELEPHONE ENCOUNTER
----- Message from Cassandra Fowler MA sent at 9/24/2019 12:04 PM CDT -----  Contact: self/[phone]  Pt states she lives out of town and she needs to R/S her US

## 2019-10-10 ENCOUNTER — PATIENT MESSAGE (OUTPATIENT)
Dept: ENDOCRINOLOGY | Facility: CLINIC | Age: 49
End: 2019-10-10

## 2019-10-10 ENCOUNTER — TELEPHONE (OUTPATIENT)
Dept: ENDOCRINOLOGY | Facility: CLINIC | Age: 49
End: 2019-10-10

## 2019-10-10 ENCOUNTER — HOSPITAL ENCOUNTER (EMERGENCY)
Facility: HOSPITAL | Age: 49
Discharge: HOME | End: 2019-10-10
Attending: EMERGENCY MEDICINE
Payer: OTHER GOVERNMENT

## 2019-10-10 VITALS
TEMPERATURE: 98 F | DIASTOLIC BLOOD PRESSURE: 68 MMHG | HEART RATE: 70 BPM | HEIGHT: 66 IN | SYSTOLIC BLOOD PRESSURE: 109 MMHG | BODY MASS INDEX: 25.23 KG/M2 | RESPIRATION RATE: 19 BRPM | OXYGEN SATURATION: 100 % | WEIGHT: 157 LBS

## 2019-10-10 DIAGNOSIS — R53.1 WEAKNESS: ICD-10-CM

## 2019-10-10 DIAGNOSIS — R25.1 TREMOR: Primary | ICD-10-CM

## 2019-10-10 DIAGNOSIS — E20.9 HYPOPARATHYROIDISM, UNSPECIFIED HYPOPARATHYROIDISM TYPE: ICD-10-CM

## 2019-10-10 LAB
ALBUMIN SERPL BCP-MCNC: 4.7 G/DL (ref 3.5–5.2)
ALP SERPL-CCNC: 95 U/L (ref 55–135)
ALT SERPL W/O P-5'-P-CCNC: 29 U/L (ref 10–44)
ANION GAP SERPL CALC-SCNC: 11 MMOL/L (ref 8–16)
AST SERPL-CCNC: 24 U/L (ref 10–40)
BASOPHILS # BLD AUTO: 0.06 K/UL (ref 0–0.2)
BASOPHILS NFR BLD: 0.6 % (ref 0–1.9)
BILIRUB SERPL-MCNC: 0.4 MG/DL (ref 0.1–1)
BUN SERPL-MCNC: 10 MG/DL (ref 6–20)
BUN SERPL-MCNC: 10 MG/DL (ref 6–30)
CALCIUM SERPL-MCNC: 9.9 MG/DL (ref 8.7–10.5)
CHLORIDE SERPL-SCNC: 101 MMOL/L (ref 95–110)
CHLORIDE SERPL-SCNC: 99 MMOL/L (ref 95–110)
CO2 SERPL-SCNC: 28 MMOL/L (ref 23–29)
CREAT SERPL-MCNC: 0.9 MG/DL (ref 0.5–1.4)
CREAT SERPL-MCNC: 1 MG/DL (ref 0.5–1.4)
DIFFERENTIAL METHOD: NORMAL
EOSINOPHIL # BLD AUTO: 0.1 K/UL (ref 0–0.5)
EOSINOPHIL NFR BLD: 0.6 % (ref 0–8)
ERYTHROCYTE [DISTWIDTH] IN BLOOD BY AUTOMATED COUNT: 13.1 % (ref 11.5–14.5)
EST. GFR  (AFRICAN AMERICAN): >60 ML/MIN/1.73 M^2
EST. GFR  (NON AFRICAN AMERICAN): >60 ML/MIN/1.73 M^2
GLUCOSE SERPL-MCNC: 101 MG/DL (ref 70–110)
GLUCOSE SERPL-MCNC: 99 MG/DL (ref 70–110)
HCT VFR BLD AUTO: 42.3 % (ref 37–48.5)
HCT VFR BLD CALC: 43 %PCV (ref 36–54)
HGB BLD-MCNC: 14.2 G/DL (ref 12–16)
IMM GRANULOCYTES # BLD AUTO: 0.04 K/UL (ref 0–0.04)
IMM GRANULOCYTES NFR BLD AUTO: 0.4 % (ref 0–0.5)
LYMPHOCYTES # BLD AUTO: 3.1 K/UL (ref 1–4.8)
LYMPHOCYTES NFR BLD: 29.5 % (ref 18–48)
MAGNESIUM SERPL-MCNC: 1.7 MG/DL (ref 1.6–2.6)
MCH RBC QN AUTO: 28.4 PG (ref 27–31)
MCHC RBC AUTO-ENTMCNC: 33.6 G/DL (ref 32–36)
MCV RBC AUTO: 85 FL (ref 82–98)
MONOCYTES # BLD AUTO: 0.8 K/UL (ref 0.3–1)
MONOCYTES NFR BLD: 7.2 % (ref 4–15)
NEUTROPHILS # BLD AUTO: 6.6 K/UL (ref 1.8–7.7)
NEUTROPHILS NFR BLD: 61.7 % (ref 38–73)
NRBC BLD-RTO: 0 /100 WBC
PHOSPHATE SERPL-MCNC: 3 MG/DL (ref 2.7–4.5)
PLATELET # BLD AUTO: 328 K/UL (ref 150–350)
PMV BLD AUTO: 10 FL (ref 9.2–12.9)
POC IONIZED CALCIUM: 1.1 MMOL/L (ref 1.06–1.42)
POC TCO2 (MEASURED): 30 MMOL/L (ref 23–29)
POTASSIUM BLD-SCNC: 3.1 MMOL/L (ref 3.5–5.1)
POTASSIUM SERPL-SCNC: 3.1 MMOL/L (ref 3.5–5.1)
PROT SERPL-MCNC: 8.6 G/DL (ref 6–8.4)
PTH-INTACT SERPL-MCNC: <5 PG/ML (ref 9–77)
RBC # BLD AUTO: 5 M/UL (ref 4–5.4)
SAMPLE: ABNORMAL
SODIUM BLD-SCNC: 142 MMOL/L (ref 136–145)
SODIUM SERPL-SCNC: 140 MMOL/L (ref 136–145)
TSH SERPL DL<=0.005 MIU/L-ACNC: 1.76 UIU/ML (ref 0.4–4)
WBC # BLD AUTO: 10.59 K/UL (ref 3.9–12.7)

## 2019-10-10 PROCEDURE — 83735 ASSAY OF MAGNESIUM: CPT

## 2019-10-10 PROCEDURE — 84100 ASSAY OF PHOSPHORUS: CPT

## 2019-10-10 PROCEDURE — 80053 COMPREHEN METABOLIC PANEL: CPT

## 2019-10-10 PROCEDURE — 93010 EKG 12-LEAD: ICD-10-PCS | Mod: ,,, | Performed by: INTERNAL MEDICINE

## 2019-10-10 PROCEDURE — 93010 ELECTROCARDIOGRAM REPORT: CPT | Mod: ,,, | Performed by: INTERNAL MEDICINE

## 2019-10-10 PROCEDURE — 84443 ASSAY THYROID STIM HORMONE: CPT

## 2019-10-10 PROCEDURE — 83970 ASSAY OF PARATHORMONE: CPT

## 2019-10-10 PROCEDURE — 25000003 PHARM REV CODE 250: Performed by: PHYSICIAN ASSISTANT

## 2019-10-10 PROCEDURE — 99285 EMERGENCY DEPT VISIT HI MDM: CPT | Mod: ,,, | Performed by: PHYSICIAN ASSISTANT

## 2019-10-10 PROCEDURE — 99285 PR EMERGENCY DEPT VISIT,LEVEL V: ICD-10-PCS | Mod: ,,, | Performed by: PHYSICIAN ASSISTANT

## 2019-10-10 PROCEDURE — 93005 ELECTROCARDIOGRAM TRACING: CPT

## 2019-10-10 PROCEDURE — 85025 COMPLETE CBC W/AUTO DIFF WBC: CPT

## 2019-10-10 PROCEDURE — 99284 EMERGENCY DEPT VISIT MOD MDM: CPT | Mod: 25

## 2019-10-10 PROCEDURE — 80048 BASIC METABOLIC PNL TOTAL CA: CPT

## 2019-10-10 PROCEDURE — 63600175 PHARM REV CODE 636 W HCPCS: Performed by: PHYSICIAN ASSISTANT

## 2019-10-10 RX ADMIN — SODIUM CHLORIDE 500 ML: 0.9 INJECTION, SOLUTION INTRAVENOUS at 11:10

## 2019-10-10 RX ADMIN — POTASSIUM BICARBONATE 50 MEQ: 978 TABLET, EFFERVESCENT ORAL at 11:10

## 2019-10-10 NOTE — ED PROVIDER NOTES
Encounter Date: 10/10/2019       History     Chief Complaint   Patient presents with    Tremors     thyroid cancer, damaged parathyroid, low calcium hx, took extra tums, tremors, weakness and fatigue     Ms Romano is a 49yoF who present for tremors and generalized weakness this morning; pertinent PMHx thyroid cancer s/p thyroidectomy with resulting PTH deficiency and history hypocalcemia, h/o hysterectomy.  Patient sources mild fatigue yesterday but of overall been in usual state of health over the past few days.  She awoke this morning with tremors noted to bilateral hands and lips.  These are her usual symptoms when her calcium is low.  She proceeded to take double her normal Tums dose with improvement in wrist spasming.  Still endorses mild generalized tremors or weakness. She also notes the symptoms increase feelings of anxiety. She was recently prescribed Medrol Dosepak for bilateral wrist pain which she experiences s/p severe tetany episode last year.  She took 2 doses of her Dosepak, though stop taking it this morning due to concern over the potentiating affects of hypocalcemia.  Denies recent GI losses, chest pain, shortness of breath, abdominal pain, dizziness, dysuria, decreased UOP.  The patients available PMH, PSH, Social History, medications, allergies, and triage vital signs were reviewed just prior to their medical evaluation.  A ten point review of systems was completed and is negative except as documented above.  Patient denies any other acute medical complaint.    Please be advised this text was dictated with Convertigo software and may contain errors due to translation.           Review of patient's allergies indicates:   Allergen Reactions    Bentyl [dicyclomine] Shortness Of Breath    Iodinated contrast media Palpitations     Past Medical History:   Diagnosis Date    Hyperparathyroidism     Hypothyroidism     Tachycardia     Thyroid cancer      Past Surgical History:   Procedure Laterality  Date    APPENDECTOMY       SECTION      HYSTERECTOMY      THYROID SURGERY       Family History   Problem Relation Age of Onset    Cancer Mother     Hypertension Mother     Cancer Father     Cancer Sister     Cancer Maternal Aunt      Social History     Tobacco Use    Smoking status: Never Smoker    Smokeless tobacco: Never Used   Substance Use Topics    Alcohol use: Not on file    Drug use: Not on file     Review of Systems   Constitutional: Positive for fatigue. Negative for chills and fever.   HENT: Negative for tinnitus and voice change.    Respiratory: Negative for cough and shortness of breath.    Cardiovascular: Negative for chest pain.   Gastrointestinal: Negative for abdominal pain, diarrhea, nausea and vomiting.   Genitourinary: Negative for decreased urine volume and dysuria.   Musculoskeletal: Negative for gait problem and neck pain.   Skin: Negative for pallor and rash.   Neurological: Positive for tremors and weakness (Generalized). Negative for dizziness and headaches.   Psychiatric/Behavioral: Negative for confusion. The patient is nervous/anxious.        Physical Exam     Initial Vitals [10/10/19 0956]   BP Pulse Resp Temp SpO2   (!) 180/90 (!) 129 18 98.4 °F (36.9 °C) 100 %      MAP       --         Physical Exam    Vitals reviewed.  Constitutional: She appears well-developed and well-nourished. She is not diaphoretic. No distress.   Nontoxic-appearing female in NAD, tachycardic in low 100s, BP stable, afebrile, 99% on RA.     HENT:   Head: Normocephalic and atraumatic.   Right Ear: External ear normal.   Left Ear: External ear normal.   Nose: Nose normal.   Mouth/Throat: Oropharynx is clear and moist.   Eyes: Conjunctivae and EOM are normal. Pupils are equal, round, and reactive to light. No scleral icterus.   Neck: Normal range of motion.   Cardiovascular: Normal rate, regular rhythm and intact distal pulses.   Pulmonary/Chest: Breath sounds normal.   Abdominal: Soft. There is  no tenderness.   Musculoskeletal: Normal range of motion. She exhibits no edema.   Neurological: She is alert and oriented to person, place, and time. She has normal strength. No cranial nerve deficit or sensory deficit.   Chvostek and trousseau signs negative  generalized tremors noted to pursed lips, digits, right leg   Skin: Skin is warm and dry. Capillary refill takes less than 2 seconds. No rash noted. No erythema. No pallor.   Psychiatric: She has a normal mood and affect. Her behavior is normal. Judgment and thought content normal.   Mildly anxious appearing         ED Course   Procedures  Labs Reviewed   COMPREHENSIVE METABOLIC PANEL - Abnormal; Notable for the following components:       Result Value    Potassium 3.1 (*)     Total Protein 8.6 (*)     All other components within normal limits   PTH, INTACT - Abnormal; Notable for the following components:    PTH, Intact <5.0 (*)     All other components within normal limits   ISTAT PROCEDURE - Abnormal; Notable for the following components:    POC Potassium 3.1 (*)     POC TCO2 (MEASURED) 30 (*)     All other components within normal limits   CBC W/ AUTO DIFFERENTIAL   MAGNESIUM   PHOSPHORUS   TSH        ECG Results          EKG 12-lead (Final result)  Result time 10/10/19 11:55:56    Final result by Interface, Lab In [facility] (10/10/19 11:55:56)                 Narrative:    Test Reason : E83.51,    Vent. Rate : 094 BPM     Atrial Rate : 094 BPM     P-R Int : 140 ms          QRS Dur : 076 ms      QT Int : 342 ms       P-R-T Axes : 065 080 056 degrees     QTc Int : 427 ms    Normal sinus rhythm  Confirmed by fellow Janes BLANCHARD (Fellow's Unofficial Report), Angelito ([id number])  on 10/10/2019 10:58:04 AM  Confirmed by Jeovany Julian MD ([id number]) on 10/10/2019 11:55:51 AM    Referred By: [email or web address]   SELF           Confirmed By:Jeovany Julian MD                            Imaging Results    None          Medical Decision Making:   History:   Old Medical Records: I decided  to obtain old medical records.  Old Records Summarized: records from clinic visits and records from previous admission(s).  Initial Assessment:   Patient with history hypoparathyroidism presents for generalized tremors, concerned for recurrence hypocalcemia, no other focal findings on exam, no infectious symptoms, VSS, afebrile  Differential Diagnosis:   DDx includes electrolyte derangement, hyperthyroidism, dehydration, anxiety. Physical exam and history taking lower clinical suspicion for tetany, arrhythmia, status epilepticus.  Independently Interpreted Test(s):   I have ordered and independently interpreted EKG Reading(s) - see prior notes  Clinical Tests:   Lab Tests: Ordered and Reviewed  Medical Tests: Ordered and Reviewed  ED Management:  EKG shows NSR, normal interval, normal axis.  Given 500 mL NS bolus.  Initial I-STAT shows mild hypokalemia, ionized calcium WNL.  Potassium repleted orally.  Labs are otherwise unremarkable, no calcium derangement.  TSH WNL.  PTH remains <5.  One read-eval, patient appears much calmer, reports improvement in symptoms. She will follow up with PCP. Patient agreed to plan of care and voiced understanding. Discharged in stable condition with strict ED return precautions.    Joseline Liz PA-C  10/10/2019    I discussed the following case, diagnosis and plan of care with attending physician.                Attending Attestation:     Physician Attestation Statement for NP/PA:   I discussed this assessment and plan of this patient with the NP/PA, but I did not personally examine the patient. The face to face encounter was performed by the NP/PA.                     Clinical Impression:       ICD-10-CM ICD-9-CM   1. Tremor R25.1 781.0   2. Weakness R53.1 780.79   3. Hypoparathyroidism, unspecified hypoparathyroidism type E20.9 252.1         Disposition:   Disposition: Discharged  Condition: Stable                        Joseline Lzi PA-C  10/10/19 1946       Angela HARDY  MD [facility]  10/11/19 1539

## 2019-10-10 NOTE — ED NOTES
Pt awake and alert; resting quietly on stretcher.  Pt remains on continuous cardiac and pulse ox monitoring with non-invasive blood pressure to cycle every 30 minutes.  VS stable; NSR noted. Pt denies pain at this time; no acute distress or discomfort reported or observed.  Pt denies restroom needs at this time; is able to reposition self on stretcher. Bed locked in lowest position; side rails up and locked x 2; call light, bedside table, and personal belongings within reach. Room assessed for safety measures and cleanliness; no action needed at this time. Plan of care discussed.  Pt instructed to alert nurse for assistance and before attempting to get out of bed; verbalizes understanding. Pt denies needs or complaints at this time; will continue to monitor.

## 2019-10-10 NOTE — ED NOTES
LOC: The patient is awake, alert and aware of environment with an appropriate affect, the patient is oriented x 3 and speaking appropriately.  APPEARANCE: Patient resting comfortably and in no acute distress, patient is clean and well groomed, patient's clothing is properly fastened.  SKIN: The skin is warm and dry, color consistent with ethnicity, patient has normal skin turgor and moist mucus membranes, skin intact, no breakdown or bruising noted.  MUSCULOSKELETAL: Patient moving all extremities spontaneously, no obvious swelling or deformities noted.  RESPIRATORY: Airway is open and patent, respirations are spontaneous, patient has a normal effort and rate  ABDOMEN: Soft and non tender to palpation, no distention noted    Patient here for eval after stating the last couple of days she has been getting weak and tired, patient has a history of thyroid issues and her calcium levels, patient states this morning her lips started trembling and then her upper extremities, patient states she doubled her calcium dose this am thinking that would releive her symptoms but was unsuccessful, patient states she is also weak in her lower extremities and hasn't been able to eat or drink when this is happening, cardiac monitoring in progress, will continue to monitor

## 2019-10-10 NOTE — TELEPHONE ENCOUNTER
----- Message from Valery Mckinley sent at 10/10/2019  8:48 AM CDT -----  Contact: Self [phone]  PT called stating she is weak, has trimmers and fatigue. Scheduled with Greg today at 2 PM. PT can be reached at [phone].

## 2019-10-23 ENCOUNTER — TELEPHONE (OUTPATIENT)
Dept: ENDOCRINOLOGY | Facility: CLINIC | Age: 49
End: 2019-10-23

## 2019-10-23 NOTE — TELEPHONE ENCOUNTER
----- Message from Agnes Barron sent at 10/23/2019  8:36 AM CDT -----  Contact: Rosita  tel:   [phone]   Caller says she needs the US rescheduled.   Pls call.

## 2019-11-01 ENCOUNTER — PATIENT MESSAGE (OUTPATIENT)
Dept: ENDOCRINOLOGY | Facility: CLINIC | Age: 49
End: 2019-11-01

## 2019-11-11 ENCOUNTER — TELEPHONE (OUTPATIENT)
Dept: ENDOCRINOLOGY | Facility: CLINIC | Age: 49
End: 2019-11-11

## 2019-11-11 ENCOUNTER — PATIENT MESSAGE (OUTPATIENT)
Dept: ENDOCRINOLOGY | Facility: CLINIC | Age: 49
End: 2019-11-11

## 2019-11-11 DIAGNOSIS — E83.51 HYPOCALCEMIA: Primary | ICD-10-CM

## 2019-11-11 NOTE — TELEPHONE ENCOUNTER
----- Message from Shannon Russell NP sent at 11/11/2019  2:45 PM CST -----  Please schedule labs at [facility] in [place]  and alert patient.    Thank you,   Shannon

## 2019-11-12 ENCOUNTER — LAB VISIT (OUTPATIENT)
Dept: LAB | Facility: HOSPITAL | Age: 49
End: 2019-11-12
Attending: NURSE PRACTITIONER
Payer: OTHER GOVERNMENT

## 2019-11-12 DIAGNOSIS — E83.51 HYPOCALCEMIA: ICD-10-CM

## 2019-11-12 LAB
ALBUMIN SERPL BCP-MCNC: 4.4 G/DL (ref 3.5–5.2)
ANION GAP SERPL CALC-SCNC: 13 MMOL/L (ref 8–16)
BUN SERPL-MCNC: 11 MG/DL (ref 6–20)
CALCIUM SERPL-MCNC: 9.3 MG/DL (ref 8.7–10.5)
CHLORIDE SERPL-SCNC: 100 MMOL/L (ref 95–110)
CO2 SERPL-SCNC: 27 MMOL/L (ref 23–29)
CREAT SERPL-MCNC: 0.9 MG/DL (ref 0.5–1.4)
EST. GFR  (AFRICAN AMERICAN): >60 ML/MIN/1.73 M^2
EST. GFR  (NON AFRICAN AMERICAN): >60 ML/MIN/1.73 M^2
GLUCOSE SERPL-MCNC: 85 MG/DL (ref 70–110)
PHOSPHATE SERPL-MCNC: 3.9 MG/DL (ref 2.7–4.5)
POTASSIUM SERPL-SCNC: 3.5 MMOL/L (ref 3.5–5.1)
SODIUM SERPL-SCNC: 140 MMOL/L (ref 136–145)

## 2019-11-12 PROCEDURE — 36415 COLL VENOUS BLD VENIPUNCTURE: CPT

## 2019-11-12 PROCEDURE — 80069 RENAL FUNCTION PANEL: CPT

## 2019-11-15 ENCOUNTER — LAB VISIT (OUTPATIENT)
Dept: LAB | Facility: HOSPITAL | Age: 49
End: 2019-11-15
Payer: OTHER GOVERNMENT

## 2019-11-15 DIAGNOSIS — E83.51 HYPOCALCEMIA: ICD-10-CM

## 2019-11-15 PROCEDURE — 82570 ASSAY OF URINE CREATININE: CPT

## 2019-11-15 PROCEDURE — 82340 ASSAY OF CALCIUM IN URINE: CPT

## 2019-11-16 LAB
CALCIUM 24H UR-MRATE: 36 MG/HR (ref 4–12)
CALCIUM UR-MCNC: 42.6 MG/DL (ref 0–15)
CALCIUM URINE (MG/SPEC): 873 MG/SPEC
CREAT 24H UR-MRATE: 58.9 MG/HR (ref 40–75)
CREAT UR-MCNC: 69 MG/DL (ref 15–325)
CREATININE, URINE (MG/SPEC): 1414.5 MG/SPEC
URINE COLLECTION DURATION: 24 HR
URINE COLLECTION DURATION: 24 HR
URINE VOLUME: 2050 ML
URINE VOLUME: 2050 ML

## 2019-11-18 ENCOUNTER — PATIENT MESSAGE (OUTPATIENT)
Dept: ENDOCRINOLOGY | Facility: CLINIC | Age: 49
End: 2019-11-18

## 2019-11-21 ENCOUNTER — TELEPHONE (OUTPATIENT)
Dept: ENDOCRINOLOGY | Facility: HOSPITAL | Age: 49
End: 2019-11-21

## 2019-11-21 ENCOUNTER — PATIENT MESSAGE (OUTPATIENT)
Dept: ENDOCRINOLOGY | Facility: CLINIC | Age: 49
End: 2019-11-21

## 2019-11-21 DIAGNOSIS — E83.51 HYPOCALCEMIA: Primary | ICD-10-CM

## 2019-11-21 DIAGNOSIS — E89.0 POSTOPERATIVE HYPOTHYROIDISM: ICD-10-CM

## 2019-11-25 ENCOUNTER — PATIENT MESSAGE (OUTPATIENT)
Dept: ENDOCRINOLOGY | Facility: CLINIC | Age: 49
End: 2019-11-25

## 2019-11-27 ENCOUNTER — LAB VISIT (OUTPATIENT)
Dept: LAB | Facility: HOSPITAL | Age: 49
End: 2019-11-27
Attending: HOSPITALIST
Payer: OTHER GOVERNMENT

## 2019-11-27 ENCOUNTER — HOSPITAL ENCOUNTER (OUTPATIENT)
Dept: ENDOCRINOLOGY | Facility: CLINIC | Age: 49
Discharge: HOME | End: 2019-11-27
Attending: NURSE PRACTITIONER
Payer: OTHER GOVERNMENT

## 2019-11-27 DIAGNOSIS — E83.51 HYPOCALCEMIA: ICD-10-CM

## 2019-11-27 DIAGNOSIS — E89.0 POSTOPERATIVE HYPOTHYROIDISM: ICD-10-CM

## 2019-11-27 DIAGNOSIS — C73 PAPILLARY THYROID CARCINOMA: ICD-10-CM

## 2019-11-27 LAB
ALBUMIN SERPL BCP-MCNC: 4.1 G/DL (ref 3.5–5.2)
ANION GAP SERPL CALC-SCNC: 9 MMOL/L (ref 8–16)
BUN SERPL-MCNC: 10 MG/DL (ref 6–20)
CALCIUM SERPL-MCNC: 9.9 MG/DL (ref 8.7–10.5)
CHLORIDE SERPL-SCNC: 103 MMOL/L (ref 95–110)
CO2 SERPL-SCNC: 30 MMOL/L (ref 23–29)
CREAT SERPL-MCNC: 1 MG/DL (ref 0.5–1.4)
EST. GFR  (AFRICAN AMERICAN): >60 ML/MIN/1.73 M^2
EST. GFR  (NON AFRICAN AMERICAN): >60 ML/MIN/1.73 M^2
GLUCOSE SERPL-MCNC: 91 MG/DL (ref 70–110)
PHOSPHATE SERPL-MCNC: 3.6 MG/DL (ref 2.7–4.5)
POTASSIUM SERPL-SCNC: 4 MMOL/L (ref 3.5–5.1)
SODIUM SERPL-SCNC: 142 MMOL/L (ref 136–145)
T4 FREE SERPL-MCNC: 1.24 NG/DL (ref 0.71–1.51)
TSH SERPL DL<=0.005 MIU/L-ACNC: 0.71 UIU/ML (ref 0.4–4)

## 2019-11-27 PROCEDURE — 76536 US EXAM OF HEAD AND NECK: CPT | Mod: 26,,, | Performed by: INTERNAL MEDICINE

## 2019-11-27 PROCEDURE — 84443 ASSAY THYROID STIM HORMONE: CPT

## 2019-11-27 PROCEDURE — 36415 COLL VENOUS BLD VENIPUNCTURE: CPT

## 2019-11-27 PROCEDURE — 76536 US SOFT TISSUE HEAD NECK THYROID: ICD-10-PCS | Mod: 26,,, | Performed by: INTERNAL MEDICINE

## 2019-11-27 PROCEDURE — 84439 ASSAY OF FREE THYROXINE: CPT

## 2019-11-27 PROCEDURE — 80069 RENAL FUNCTION PANEL: CPT

## 2019-11-29 ENCOUNTER — PATIENT MESSAGE (OUTPATIENT)
Dept: ENDOCRINOLOGY | Facility: CLINIC | Age: 49
End: 2019-11-29

## 2019-12-05 ENCOUNTER — PATIENT MESSAGE (OUTPATIENT)
Dept: ENDOCRINOLOGY | Facility: CLINIC | Age: 49
End: 2019-12-05

## 2019-12-05 DIAGNOSIS — E83.51 HYPOCALCEMIA: Primary | ICD-10-CM

## 2019-12-26 ENCOUNTER — LAB VISIT (OUTPATIENT)
Dept: LAB | Facility: HOSPITAL | Age: 49
End: 2019-12-26
Attending: HOSPITALIST
Payer: OTHER GOVERNMENT

## 2019-12-26 DIAGNOSIS — E83.51 HYPOCALCEMIA: ICD-10-CM

## 2019-12-26 LAB
ALBUMIN SERPL BCP-MCNC: 4.4 G/DL (ref 3.5–5.2)
ANION GAP SERPL CALC-SCNC: 12 MMOL/L (ref 8–16)
BUN SERPL-MCNC: 12 MG/DL (ref 6–20)
CALCIUM SERPL-MCNC: 8.8 MG/DL (ref 8.7–10.5)
CHLORIDE SERPL-SCNC: 100 MMOL/L (ref 95–110)
CO2 SERPL-SCNC: 26 MMOL/L (ref 23–29)
CREAT SERPL-MCNC: 0.8 MG/DL (ref 0.5–1.4)
EST. GFR  (AFRICAN AMERICAN): >60 ML/MIN/1.73 M^2
EST. GFR  (NON AFRICAN AMERICAN): >60 ML/MIN/1.73 M^2
GLUCOSE SERPL-MCNC: 82 MG/DL (ref 70–110)
PHOSPHATE SERPL-MCNC: 3.6 MG/DL (ref 2.7–4.5)
POTASSIUM SERPL-SCNC: 3.4 MMOL/L (ref 3.5–5.1)
SODIUM SERPL-SCNC: 138 MMOL/L (ref 136–145)

## 2019-12-26 PROCEDURE — 80069 RENAL FUNCTION PANEL: CPT

## 2019-12-26 PROCEDURE — 36415 COLL VENOUS BLD VENIPUNCTURE: CPT

## 2019-12-30 ENCOUNTER — OFFICE VISIT (OUTPATIENT)
Dept: ENDOCRINOLOGY | Facility: CLINIC | Age: 49
End: 2019-12-30
Payer: OTHER GOVERNMENT

## 2019-12-30 VITALS
BODY MASS INDEX: 25.58 KG/M2 | RESPIRATION RATE: 18 BRPM | DIASTOLIC BLOOD PRESSURE: 78 MMHG | HEIGHT: 66 IN | WEIGHT: 159.19 LBS | SYSTOLIC BLOOD PRESSURE: 110 MMHG | HEART RATE: 76 BPM

## 2019-12-30 DIAGNOSIS — E89.2 HYPOPARATHYROIDISM AFTER PROCEDURE: ICD-10-CM

## 2019-12-30 DIAGNOSIS — E83.51 IATROGENIC HYPOCALCEMIA: Chronic | ICD-10-CM

## 2019-12-30 DIAGNOSIS — C73 PAPILLARY THYROID CARCINOMA: Primary | ICD-10-CM

## 2019-12-30 DIAGNOSIS — E87.6 HYPOKALEMIA: ICD-10-CM

## 2019-12-30 DIAGNOSIS — E89.0 POSTOPERATIVE HYPOTHYROIDISM: ICD-10-CM

## 2019-12-30 DIAGNOSIS — R82.994 HYPERCALCIURIA: ICD-10-CM

## 2019-12-30 PROCEDURE — 99999 PR PBB SHADOW E&M-EST. PATIENT-LVL IV: CPT | Mod: PBBFAC,,, | Performed by: HOSPITALIST

## 2019-12-30 PROCEDURE — 99214 OFFICE O/P EST MOD 30 MIN: CPT | Mod: S$PBB,,, | Performed by: INTERNAL MEDICINE

## 2019-12-30 PROCEDURE — 99999 PR PBB SHADOW E&M-EST. PATIENT-LVL IV: ICD-10-PCS | Mod: PBBFAC,,, | Performed by: HOSPITALIST

## 2019-12-30 PROCEDURE — 99214 OFFICE O/P EST MOD 30 MIN: CPT | Mod: PBBFAC | Performed by: HOSPITALIST

## 2019-12-30 PROCEDURE — 99214 PR OFFICE/OUTPT VISIT, EST, LEVL IV, 30-39 MIN: ICD-10-PCS | Mod: S$PBB,,, | Performed by: INTERNAL MEDICINE

## 2019-12-30 RX ORDER — LEVOTHYROXINE SODIUM 88 UG/1
88 TABLET ORAL
Qty: 30 TABLET | Refills: 11 | Status: SHIPPED | OUTPATIENT
Start: 2019-12-30 | End: 2020-09-17

## 2019-12-30 RX ORDER — CLONAZEPAM 0.5 MG/1
TABLET ORAL
COMMUNITY
End: 2020-03-06

## 2019-12-30 RX ORDER — POTASSIUM CHLORIDE 20 MEQ/1
20 TABLET, EXTENDED RELEASE ORAL DAILY
COMMUNITY
End: 2020-09-08

## 2019-12-30 NOTE — ASSESSMENT & PLAN NOTE
- Due to parathyroid glands were destroyed in her thyroidectomy, most likely PTH will not recover as her surgery has been >1 year.   - At this time treat calcium with medical therapy with TUMS 1.5g TID and calcitriol 0.5 mcg BID. Vit D 1000 iu daily, HCTZ 25 mg daily  - All of her symptoms have improved beside from some mild tingling  - She has started K supplement for hypoK due to HCTZ  - Initial discussion about starting NatPara in the past, but has been place on hold due to current recall  - Goal: Ca 8-8.5    Plan  - Recommend that she decrease, calcium supplement to 1000mg three times a day if possible, pt wants to hold of as she just started her job and is worried about Side Effect. Will wait 2 weeks and get lab work, if stable she is will start the lower dose Calcium  - Continue Calcitriol 0.5 mcg BID. Vit D 1000 iu daily  - Continue HCTZ and K supplement  - Will check Urine Ca in 4-6 weeks with lab work

## 2019-12-30 NOTE — ASSESSMENT & PLAN NOTE
- Reviewed pathology, she has low risk disease based on report.  - discussed i131 with pt, who does not want i131 and since it is low risk disease she does not require i131 treatment  - thyroglobulin has been undetectable since surgery. Recheck in yearly  - TSH goal 2 or less

## 2019-12-30 NOTE — PATIENT INSTRUCTIONS
Please decrease Calcium supplement to 1000mg three times a day if possible    Start Synthroid 88 mcg daily

## 2019-12-30 NOTE — PROGRESS NOTES
Subjective:      Patient ID: Rosita Romano is a 49 y.o. female.    Chief Complaint:  Hypothyroidism    History of Present Illness  Rosita Romano presents today for follow up of thyroid cancer, hypocalcemia. First time seen by my, prior visit with LETICIA Russell NP    Interval hx: Since the last time she was here, she started her new job. She is very excited to be back to work  She got Klonopin for her anxiety.    still having fatigue but better  Mild tingling on her nose and feet since decrease of calcium to 1500mg TID      With regards to her hypothyroidism:  Current medication: 75mcg (Sunday-Friday) Sat 150mcg, take it at 5:30 AM, on empty stomach, does not skip dose  Takes thyroid medication properly without food first thing in the morning   Occ fatigue, improving HA & Anxiety & palpitations  Report belching, Normal BMs  Requesting to take SYNTHROID 88 mcg daily instead as it would make it easier on her schedule  Report some low HR 50s, taken off BB now      With regards to her Hypocalcemia/Hypoparathyroidism  Her parathyroid glands were destroyed in surgery.  She is still taking 1.5g calcium TID and calcitriol 0.5 mcg BID. Vit D 1000 iu daily, HCTZ 25 mg daily  Plan is to optimize calcium as much as possible due to the fact that PTH may recover within up to 1 year after surgery.   All of her symptoms have improved beside from some mild tingling. She has started K supplement for hypoK due to HCTZ  Initial discussion about starting NatPara in the past, but has been place on hold due to current recall      With regards to her Papillary thyroid carcinoma  Referred by Dr. John Munoz.   Total thyroidectomy, [facility], [id number] multifocal PTC - 05/31/2018 (see path report in media tab)  Reviewed pathology report with Dr. Aguilar, she has low risk disease based on report. discussed i131 with pt, who does not want i131, since it is low risk disease she does not require i131 treatment.   Thyroglobulin has been undetectable  "since surgery. Recheck in 3 months.  TSH goal 2 or less. Low normal.     Outside Labs: 4/1/19 TG <0.1  TSH 0.036  Free T4 1.29       Review of Systems   Constitutional: Negative for activity change.   HENT: Negative for congestion.    Eyes: Negative for visual disturbance.   Respiratory: Negative for shortness of breath.    Cardiovascular: Negative for chest pain.   Gastrointestinal: Negative for abdominal pain.   Genitourinary: Negative for urgency.   Musculoskeletal: Negative for arthralgias.   Skin: Negative for wound.   Neurological: Positive for numbness. Negative for weakness.   Psychiatric/Behavioral: Negative for confusion.       Objective:   Physical Exam  /78   Pulse 76   Resp 18   Ht 5' 6" (1.676 m)   Wt 72.2 kg (159 lb 2.8 oz)   BMI 25.69 kg/m²     Body mass index is 25.69 kg/m².    Lab Review:   No results found for: HGBA1C  No results found for: CHOL, HDL, LDLCALC, TRIG, CHOLHDL  Lab Results   Component Value Date     12/26/2019    K 3.4 (L) 12/26/2019     12/26/2019    CO2 26 12/26/2019    GLU 82 12/26/2019    BUN 12 12/26/2019    CREATININE 0.8 12/26/2019    CALCIUM 8.8 12/26/2019    PROT 8.6 (H) 10/10/2019    ALBUMIN 4.4 12/26/2019    BILITOT 0.4 10/10/2019    ALKPHOS 95 10/10/2019    AST 24 10/10/2019    ALT 29 10/10/2019    ANIONGAP 12 12/26/2019    ESTGFRAFRICA >60.0 12/26/2019    EGFRNONAA >60.0 12/26/2019    TSH 0.715 11/27/2019     Vit D, 25-Hydroxy   Date Value Ref Range Status   07/29/2019 41 30 - 96 ng/mL Final     Comment:     Vitamin D deficiency.........<10 ng/mL                              Vitamin D insufficiency......10-29 ng/mL       Vitamin D sufficiency........> or equal to 30 ng/mL  Vitamin D toxicity............>100 ng/mL           Assessment and Plan     Iatrogenic hypocalcemia  - Due to parathyroid glands were destroyed in her thyroidectomy, most likely PTH will not recover as her surgery has been >1 year.   - At this time treat calcium with medical " therapy with TUMS 1.5g TID and calcitriol 0.5 mcg BID. Vit D 1000 iu daily, HCTZ 25 mg daily  - All of her symptoms have improved beside from some mild tingling  - She has started K supplement for hypoK due to HCTZ  - Initial discussion about starting NatPara in the past, but has been place on hold due to current recall  - Goal: Ca 8-8.5    Plan  - Recommend that she decrease, calcium supplement to 1000mg three times a day if possible, pt wants to hold of as she just started her job and is worried about Side Effect. Will wait 2 weeks and get lab work, if stable she is will start the lower dose Calcium  - Continue Calcitriol 0.5 mcg BID. Vit D 1000 iu daily  - Continue HCTZ and K supplement  - Will check Urine Ca in 4-6 weeks with lab work    Hypercalciuria  - continue HCTZ, repeat Urine Ca  - monitor for stones    Papillary thyroid carcinoma  - Reviewed pathology, she has low risk disease based on report.  - discussed i131 with pt, who does not want i131 and since it is low risk disease she does not require i131 treatment  - thyroglobulin has been undetectable since surgery. Recheck in yearly  - TSH goal 2 or less     Hypokalemia  - on supplement    Hypoparathyroidism after procedure  - see hypocalcemia plan above    Postoperative hypothyroidism  - TSH reviewed, at goal  - pt request name brand synthroid  - pt request to be on 88mcg daily rather than 75mg for 6 days and 150mcg one day  - order placed  - repeat TFTs in 6 weeks      RTC in 3mo   Pt had many questions that were answered this visit    Param Salcedo MD  Endocrinology Fellow  12/30/2019 3:51 PM

## 2019-12-30 NOTE — ASSESSMENT & PLAN NOTE
- TSH reviewed, at goal  - pt request name brand synthroid  - pt request to be on 88mcg daily rather than 75mg for 6 days and 150mcg one day  - order placed  - repeat TFTs in 6 weeks

## 2019-12-31 NOTE — PROGRESS NOTES
I have reviewed and concur with Dr. Salcedo's history, physical, assessment, and plan.  I have personally interviewed and examined the patient.    Calcium a bit above goal of 8-8.5 but she wishes to continue current supplementation for now which is reasonable. Will repeat labs in few weeks and if stable repeat 24 hr urine collection to evaluate for improvement in hypercalciuria with previous decreases in calcium supplementation    Amelia Prado MD

## 2020-01-09 ENCOUNTER — LAB VISIT (OUTPATIENT)
Dept: LAB | Facility: HOSPITAL | Age: 50
End: 2020-01-09
Attending: HOSPITALIST
Payer: OTHER GOVERNMENT

## 2020-01-09 ENCOUNTER — PATIENT MESSAGE (OUTPATIENT)
Dept: ENDOCRINOLOGY | Facility: CLINIC | Age: 50
End: 2020-01-09

## 2020-01-09 DIAGNOSIS — E83.51 HYPOCALCEMIA: ICD-10-CM

## 2020-01-09 DIAGNOSIS — E89.2 HYPOPARATHYROIDISM AFTER PROCEDURE: ICD-10-CM

## 2020-01-09 DIAGNOSIS — E83.51 IATROGENIC HYPOCALCEMIA: Chronic | ICD-10-CM

## 2020-01-09 DIAGNOSIS — E89.0 POSTOPERATIVE HYPOTHYROIDISM: Primary | ICD-10-CM

## 2020-01-09 DIAGNOSIS — R82.994 HYPERCALCIURIA: ICD-10-CM

## 2020-01-09 LAB
ALBUMIN SERPL BCP-MCNC: 4.6 G/DL (ref 3.5–5.2)
ANION GAP SERPL CALC-SCNC: 13 MMOL/L (ref 8–16)
BUN SERPL-MCNC: 13 MG/DL (ref 6–20)
CALCIUM SERPL-MCNC: 9.6 MG/DL (ref 8.7–10.5)
CHLORIDE SERPL-SCNC: 98 MMOL/L (ref 95–110)
CO2 SERPL-SCNC: 28 MMOL/L (ref 23–29)
CREAT SERPL-MCNC: 0.9 MG/DL (ref 0.5–1.4)
EST. GFR  (AFRICAN AMERICAN): >60 ML/MIN/1.73 M^2
EST. GFR  (NON AFRICAN AMERICAN): >60 ML/MIN/1.73 M^2
GLUCOSE SERPL-MCNC: 90 MG/DL (ref 70–110)
MAGNESIUM SERPL-MCNC: 1.9 MG/DL (ref 1.6–2.6)
PHOSPHATE SERPL-MCNC: 5.1 MG/DL (ref 2.7–4.5)
POTASSIUM SERPL-SCNC: 3.9 MMOL/L (ref 3.5–5.1)
SODIUM SERPL-SCNC: 139 MMOL/L (ref 136–145)

## 2020-01-09 PROCEDURE — 80069 RENAL FUNCTION PANEL: CPT

## 2020-01-09 PROCEDURE — 83735 ASSAY OF MAGNESIUM: CPT

## 2020-01-09 PROCEDURE — 36415 COLL VENOUS BLD VENIPUNCTURE: CPT

## 2020-01-20 ENCOUNTER — LAB VISIT (OUTPATIENT)
Dept: LAB | Facility: HOSPITAL | Age: 50
End: 2020-01-20
Attending: HOSPITALIST
Payer: OTHER GOVERNMENT

## 2020-01-20 DIAGNOSIS — E83.51 HYPOCALCEMIA: ICD-10-CM

## 2020-01-20 DIAGNOSIS — E89.0 POSTOPERATIVE HYPOTHYROIDISM: ICD-10-CM

## 2020-01-20 LAB
ALBUMIN SERPL BCP-MCNC: 4.2 G/DL (ref 3.5–5.2)
ANION GAP SERPL CALC-SCNC: 9 MMOL/L (ref 8–16)
BUN SERPL-MCNC: 9 MG/DL (ref 6–20)
CALCIUM SERPL-MCNC: 9.4 MG/DL (ref 8.7–10.5)
CHLORIDE SERPL-SCNC: 101 MMOL/L (ref 95–110)
CO2 SERPL-SCNC: 30 MMOL/L (ref 23–29)
CREAT SERPL-MCNC: 0.9 MG/DL (ref 0.5–1.4)
EST. GFR  (AFRICAN AMERICAN): >60 ML/MIN/1.73 M^2
EST. GFR  (NON AFRICAN AMERICAN): >60 ML/MIN/1.73 M^2
GLUCOSE SERPL-MCNC: 93 MG/DL (ref 70–110)
PHOSPHATE SERPL-MCNC: 4.3 MG/DL (ref 2.7–4.5)
PHOSPHATE SERPL-MCNC: 4.3 MG/DL (ref 2.7–4.5)
POTASSIUM SERPL-SCNC: 3.6 MMOL/L (ref 3.5–5.1)
SODIUM SERPL-SCNC: 140 MMOL/L (ref 136–145)
T4 FREE SERPL-MCNC: 1.13 NG/DL (ref 0.71–1.51)
TSH SERPL DL<=0.005 MIU/L-ACNC: 1.6 UIU/ML (ref 0.34–5.6)

## 2020-01-20 PROCEDURE — 84439 ASSAY OF FREE THYROXINE: CPT

## 2020-01-20 PROCEDURE — 84443 ASSAY THYROID STIM HORMONE: CPT

## 2020-01-20 PROCEDURE — 80069 RENAL FUNCTION PANEL: CPT

## 2020-01-20 PROCEDURE — 36415 COLL VENOUS BLD VENIPUNCTURE: CPT

## 2020-01-22 ENCOUNTER — PATIENT MESSAGE (OUTPATIENT)
Dept: ENDOCRINOLOGY | Facility: CLINIC | Age: 50
End: 2020-01-22

## 2020-01-22 DIAGNOSIS — E89.0 POSTOPERATIVE HYPOTHYROIDISM: ICD-10-CM

## 2020-01-22 DIAGNOSIS — E83.51 HYPOCALCEMIA: Primary | ICD-10-CM

## 2020-01-22 DIAGNOSIS — R82.994 HYPERCALCIURIA: ICD-10-CM

## 2020-02-06 ENCOUNTER — PATIENT MESSAGE (OUTPATIENT)
Dept: ENDOCRINOLOGY | Facility: CLINIC | Age: 50
End: 2020-02-06

## 2020-02-06 ENCOUNTER — LAB VISIT (OUTPATIENT)
Dept: LAB | Facility: HOSPITAL | Age: 50
End: 2020-02-06
Attending: HOSPITALIST
Payer: OTHER GOVERNMENT

## 2020-02-06 DIAGNOSIS — E83.51 HYPOCALCEMIA: ICD-10-CM

## 2020-02-06 DIAGNOSIS — R82.994 HYPERCALCIURIA: ICD-10-CM

## 2020-02-06 DIAGNOSIS — E89.0 POSTOPERATIVE HYPOTHYROIDISM: ICD-10-CM

## 2020-02-06 LAB
ALBUMIN SERPL BCP-MCNC: 4.5 G/DL (ref 3.5–5.2)
ANION GAP SERPL CALC-SCNC: 9 MMOL/L (ref 8–16)
BUN SERPL-MCNC: 13 MG/DL (ref 6–20)
CALCIUM SERPL-MCNC: 9.7 MG/DL (ref 8.7–10.5)
CHLORIDE SERPL-SCNC: 100 MMOL/L (ref 95–110)
CO2 SERPL-SCNC: 28 MMOL/L (ref 23–29)
CREAT SERPL-MCNC: 1 MG/DL (ref 0.5–1.4)
EST. GFR  (AFRICAN AMERICAN): >60 ML/MIN/1.73 M^2
EST. GFR  (NON AFRICAN AMERICAN): >60 ML/MIN/1.73 M^2
GLUCOSE SERPL-MCNC: 92 MG/DL (ref 70–110)
MAGNESIUM SERPL-MCNC: 1.7 MG/DL (ref 1.6–2.6)
PHOSPHATE SERPL-MCNC: 4.2 MG/DL (ref 2.7–4.5)
POTASSIUM SERPL-SCNC: 3.6 MMOL/L (ref 3.5–5.1)
SODIUM SERPL-SCNC: 137 MMOL/L (ref 136–145)

## 2020-02-06 PROCEDURE — 80069 RENAL FUNCTION PANEL: CPT

## 2020-02-06 PROCEDURE — 36415 COLL VENOUS BLD VENIPUNCTURE: CPT

## 2020-02-06 PROCEDURE — 83735 ASSAY OF MAGNESIUM: CPT

## 2020-02-07 ENCOUNTER — TELEPHONE (OUTPATIENT)
Dept: ENDOCRINOLOGY | Facility: CLINIC | Age: 50
End: 2020-02-07

## 2020-02-07 DIAGNOSIS — R82.994 HYPERCALCIURIA: ICD-10-CM

## 2020-02-07 DIAGNOSIS — E89.0 POSTOPERATIVE HYPOTHYROIDISM: ICD-10-CM

## 2020-02-07 DIAGNOSIS — E83.51 HYPOCALCEMIA: Primary | ICD-10-CM

## 2020-02-07 DIAGNOSIS — E87.6 HYPOKALEMIA: ICD-10-CM

## 2020-02-07 RX ORDER — CALCITRIOL 0.25 UG/1
0.25 CAPSULE ORAL 2 TIMES DAILY
Qty: 60 CAPSULE | Refills: 11 | Status: SHIPPED | OUTPATIENT
Start: 2020-02-07 | End: 2020-05-01 | Stop reason: SDUPTHER

## 2020-02-07 NOTE — TELEPHONE ENCOUNTER
Called patient on on 2/7/2020 2:34 PM to review recent result.   I discussed the recent results and that her calciums and other blood work is stable  Pt is aware at this time of finding.  All questions were answered to patient satisfaction     Plan to continue TUMS 1000mg BID, lower Calcitriol to 0.25 mcg BID (sent to [facility])    Pt request lab work in 2 weeks and request PTH    Lab work ordered in 2 weeks         Param Salcedo MD  Endocrinology Fellow  2/7/2020

## 2020-02-18 ENCOUNTER — PATIENT MESSAGE (OUTPATIENT)
Dept: ENDOCRINOLOGY | Facility: CLINIC | Age: 50
End: 2020-02-18

## 2020-02-18 DIAGNOSIS — E89.0 POSTOPERATIVE HYPOTHYROIDISM: Primary | ICD-10-CM

## 2020-02-19 ENCOUNTER — LAB VISIT (OUTPATIENT)
Dept: LAB | Facility: HOSPITAL | Age: 50
End: 2020-02-19
Attending: HOSPITALIST
Payer: OTHER GOVERNMENT

## 2020-02-19 ENCOUNTER — PATIENT MESSAGE (OUTPATIENT)
Dept: ENDOCRINOLOGY | Facility: CLINIC | Age: 50
End: 2020-02-19

## 2020-02-19 DIAGNOSIS — E87.6 HYPOKALEMIA: ICD-10-CM

## 2020-02-19 DIAGNOSIS — E89.0 POSTOPERATIVE HYPOTHYROIDISM: ICD-10-CM

## 2020-02-19 DIAGNOSIS — R82.994 HYPERCALCIURIA: ICD-10-CM

## 2020-02-19 DIAGNOSIS — E83.51 HYPOCALCEMIA: ICD-10-CM

## 2020-02-19 LAB
ALBUMIN SERPL BCP-MCNC: 4.7 G/DL (ref 3.5–5.2)
ALP SERPL-CCNC: 60 U/L (ref 55–135)
ALT SERPL W/O P-5'-P-CCNC: 19 U/L (ref 10–44)
ANION GAP SERPL CALC-SCNC: 10 MMOL/L (ref 8–16)
AST SERPL-CCNC: 22 U/L (ref 10–40)
BILIRUB SERPL-MCNC: 0.8 MG/DL (ref 0.1–1)
BUN SERPL-MCNC: 11 MG/DL (ref 6–20)
CALCIUM SERPL-MCNC: 8.9 MG/DL (ref 8.7–10.5)
CHLORIDE SERPL-SCNC: 102 MMOL/L (ref 95–110)
CO2 SERPL-SCNC: 29 MMOL/L (ref 23–29)
CREAT SERPL-MCNC: 0.8 MG/DL (ref 0.5–1.4)
EST. GFR  (AFRICAN AMERICAN): >60 ML/MIN/1.73 M^2
EST. GFR  (NON AFRICAN AMERICAN): >60 ML/MIN/1.73 M^2
GLUCOSE SERPL-MCNC: 73 MG/DL (ref 70–110)
MAGNESIUM SERPL-MCNC: 1.9 MG/DL (ref 1.6–2.6)
POTASSIUM SERPL-SCNC: 3.7 MMOL/L (ref 3.5–5.1)
PROT SERPL-MCNC: 8.1 G/DL (ref 6–8.4)
SODIUM SERPL-SCNC: 141 MMOL/L (ref 136–145)
T4 FREE SERPL-MCNC: 1.11 NG/DL (ref 0.71–1.51)
TSH SERPL DL<=0.005 MIU/L-ACNC: 2 UIU/ML (ref 0.34–5.6)

## 2020-02-19 PROCEDURE — 83735 ASSAY OF MAGNESIUM: CPT

## 2020-02-19 PROCEDURE — 36415 COLL VENOUS BLD VENIPUNCTURE: CPT

## 2020-02-19 PROCEDURE — 80053 COMPREHEN METABOLIC PANEL: CPT

## 2020-02-19 PROCEDURE — 83970 ASSAY OF PARATHORMONE: CPT

## 2020-02-19 PROCEDURE — 84439 ASSAY OF FREE THYROXINE: CPT

## 2020-02-19 PROCEDURE — 84443 ASSAY THYROID STIM HORMONE: CPT

## 2020-02-20 ENCOUNTER — PATIENT MESSAGE (OUTPATIENT)
Dept: ENDOCRINOLOGY | Facility: CLINIC | Age: 50
End: 2020-02-20

## 2020-02-20 LAB — PTH-INTACT SERPL-MCNC: <5 PG/ML (ref 9–77)

## 2020-02-27 ENCOUNTER — PATIENT MESSAGE (OUTPATIENT)
Dept: ENDOCRINOLOGY | Facility: CLINIC | Age: 50
End: 2020-02-27

## 2020-03-03 ENCOUNTER — PATIENT MESSAGE (OUTPATIENT)
Dept: ENDOCRINOLOGY | Facility: CLINIC | Age: 50
End: 2020-03-03

## 2020-03-05 NOTE — PROGRESS NOTES
Subjective:      Patient ID: Rosita Romano is a 49 y.o. female.    Chief Complaint:  Thyroid Problem    History of Present Illness  Rosita Romano presents today for follow up of thyroid cancer, hypocalcemia. Previous patient of JOSEE Russell and Dr. Prado/Denisse. Last visit in Dec 2019.      Referred by Dr. John Munoz.   Total thyroidectomy, [facility], [id number] multifocal PTC - 05/31/2018 (see path report in media tab)     Interval history:  She was told undergo HUNG and was set to be treated but the endocrinologist at [facility] has left. She is taking Synthroid 88 mcg QD- not usually missing doses (missed once this week)- she is taking correctly.     Outside Labs:`4/1/19 TG <0.1  TSH 0.036  Free T4 1.29  PTH <6.3  Vit D 25    She is on Ativan PRN for her anxiety.      With regards to her hypothyroidism:     Current medication:   generic Synthroid 88 mcg daily     Takes thyroid medication properly without food first thing in the morning     Report some low HR 50s, taken off BB      Since we changed her medication:  Overall she has been feeling better since medication was changed. She still complains of some palpitations occasionally.   Since surgery, she has lost about 20 pounds. Reports anorexia        Ref. Range 2/19/2020 12:58   TSH Latest Ref Range: 0.340 - 5.600 uIU/mL 1.998   Free T4 Latest Ref Range: 0.71 - 1.51 ng/dL 1.11     With regards to her Hypocalcemia/Hypoparathyroidism  Her parathyroid glands were destroyed in surgery.    She had been having lethargy & difficulty working, driving, & functioning with symptoms of hypocalcemia.   She will occ have a tingle in her lip around 4PM.She reports muscle cramps in her feet.   No numbness or tingling in hands or feet.     She is taking TUMS 1000 mg in AM, 1000 in afternoon, and 1500 mg at night (should be taking 1000 mg at night but started having symptoms and increased herself over the last week) and rocaltrol 0.25 mcg in AM and 0.5 mcg in PM increased herself over the  last week (should be taking 0.25 mcg BID).     She was on HCTZ but has stopped taking it over the last 2 days as her SBP was dropping to 104 and she was having symptoms of dizziness. She did not try to switch HCTZ at night yet.       Vit D 1000 iu daily     Plan is to optimize calcium as much as possible due to the fact that PTH may recover within up to 1 year after surgery.   She is having symptoms as above. She has started K supplement for hypoK due to HCTZ  Initial discussion about starting NatPara in the past, but has been place on hold due to current recall. She is on support group for Natpara. She is reserved on starting the Natpara due to osteosarcoma risk.      Lab Results   Component Value Date    PTH <5.0 (L) 02/19/2020    CALCIUM 8.9 02/19/2020    CAION 1.27 08/15/2019    PHOS 4.2 02/06/2020   corrected calcium 8.3-at goal   However, these labs were done prior to increase of the TUMS to 1500 mg at HS and calcitrol to 0.5 mcg at HS.     With regards to her Papillary thyroid carcinoma  Referred by Dr. John Munoz.   Total thyroidectomy, [facility], [id number] multifocal PTC - 05/31/2018 (see path report in media tab)  Reviewed pathology report with Dr. Aguilar, she has low risk disease based on report. discussed i131 with pt, who does not want i131, since it is low risk disease she does not require i131 treatment.   Thyroglobulin has been undetectable since surgery. Recheck in 3 months.  TSH goal 2 or less. Low normal.     Outside Labs: 4/1/19 TG <0.1  TSH 0.036  Free T4 1.29      Ref. Range 7/29/2019 10:24   Thyroglobulin Antibody Screen Latest Ref Range: <4.0 IU/mL <1.8   Thyroglobulin, Tumor Marker Latest Units: ng/mL 0.2 (H)       Review of Systems   Constitutional: Negative for activity change.   HENT: Negative for congestion.    Eyes: Negative for visual disturbance.   Respiratory: Negative for shortness of breath.    Cardiovascular: Negative for chest pain.   Gastrointestinal: Negative for abdominal  "pain.   Genitourinary: Negative for urgency.   Musculoskeletal: Positive for myalgias. Negative for arthralgias.   Skin: Negative for wound.   Neurological: Positive for dizziness and numbness. Negative for weakness.   Psychiatric/Behavioral: Negative for confusion.     Objective:   Physical Exam   Constitutional: She appears well-developed.   Neck: No thyromegaly present.   Cardiovascular: Normal rate.   Pulmonary/Chest: Effort normal.   Abdominal: Soft.   Vitals reviewed.    /78 (BP Location: Left arm, Patient Position: Sitting)   Pulse 68   Ht 5' 6" (1.676 m)   Wt 71.6 kg (157 lb 15.4 oz)   BMI 25.50 kg/m²     Body mass index is 25.5 kg/m².    Lab Review:   No results found for: HGBA1C  No results found for: CHOL, HDL, LDLCALC, TRIG, CHOLHDL  Lab Results   Component Value Date     02/19/2020    K 3.7 02/19/2020     02/19/2020    CO2 29 02/19/2020    GLU 73 02/19/2020    BUN 11 02/19/2020    CREATININE 0.8 02/19/2020    CALCIUM 8.9 02/19/2020    PROT 8.1 02/19/2020    ALBUMIN 4.7 02/19/2020    BILITOT 0.8 02/19/2020    ALKPHOS 60 02/19/2020    AST 22 02/19/2020    ALT 19 02/19/2020    ANIONGAP 10 02/19/2020    ESTGFRAFRICA >60.0 02/19/2020    EGFRNONAA >60.0 02/19/2020    TSH 1.998 02/19/2020     Vit D, 25-Hydroxy   Date Value Ref Range Status   07/29/2019 41 30 - 96 ng/mL Final     Comment:     Vitamin D deficiency.........<10 ng/mL                              Vitamin D insufficiency......10-29 ng/mL       Vitamin D sufficiency........> or equal to 30 ng/mL  Vitamin D toxicity............>100 ng/mL           Assessment and Plan     1. Hypocalcemia  Renal function panel   2. Papillary thyroid carcinoma     3. Hypercalciuria     4. Postoperative hypothyroidism     5. Hypoparathyroidism after procedure       Papillary thyroid carcinoma  - Reviewed pathology, she has low risk disease based on report.  - discussed i131 with pt, who does not want i131 and since it is low risk disease she does not " require i131 treatment  - thyroglobulin has been undetectable since surgery. Recheck in yearly  - TSH goal 2 or less     Hypocalcemia  -- Due to parathyroid glands were destroyed in her thyroidectomy, most likely PTH will not recover as her surgery has been >1 year.   - At this time treat calcium with medical therapy   TUMS 1000 mg in AM, 1000 in afternoon, and 1500 mg at night and rocaltrol 0.25 mcg in AM and 0.5 mcg in PM. Check renal function panel today. Discussed signs and symptoms of hypercalcemia. Discussed dangers of hypercalcemia.   - Check renal function panel today.  - Some of her symptoms have improved since surgery but she continues to adjust doses of calcium.    - Continue K supplement for hypoK due to HCTZ  - Initial discussion about starting NatPara in the past, but has been place on hold due to current recall. Discussed Natpara risk of osteosarcoma. She agrees to start in the future  - Goal: Ca 8-8.5  - Restart HCTZ 12.5 mg at night. Discussed she must be on for the next 2-3 weeks prior to obtaining 24 hour urine.  - Can consider Forteo while awaiting natpara. Will await more recent labs        Hypercalciuria  - continue HCTZ, repeat Urine Ca  - monitor for stones    Postoperative hypothyroidism  - TSH reviewed, at goal  - pt request name brand synthroid  - continue Synthroid 88 mcg daily       Hypoparathyroidism after procedure  - see hypocalcemia plan above    Follow up in about 4 weeks (around 4/3/2020).    Digna Rivera NP  3/6/2020     Case discussed with Dr. Aguilar  Recommendations were discussed with the patient in detail  The patient verbalized understanding and agrees with the plan outlined as above.

## 2020-03-06 ENCOUNTER — OFFICE VISIT (OUTPATIENT)
Dept: ENDOCRINOLOGY | Facility: CLINIC | Age: 50
End: 2020-03-06
Payer: OTHER GOVERNMENT

## 2020-03-06 ENCOUNTER — LAB VISIT (OUTPATIENT)
Dept: LAB | Facility: HOSPITAL | Age: 50
End: 2020-03-06
Payer: OTHER GOVERNMENT

## 2020-03-06 VITALS
HEART RATE: 68 BPM | BODY MASS INDEX: 25.38 KG/M2 | WEIGHT: 157.94 LBS | DIASTOLIC BLOOD PRESSURE: 78 MMHG | SYSTOLIC BLOOD PRESSURE: 122 MMHG | HEIGHT: 66 IN

## 2020-03-06 DIAGNOSIS — E83.51 HYPOCALCEMIA: Primary | ICD-10-CM

## 2020-03-06 DIAGNOSIS — E89.0 POSTOPERATIVE HYPOTHYROIDISM: ICD-10-CM

## 2020-03-06 DIAGNOSIS — R82.994 HYPERCALCIURIA: ICD-10-CM

## 2020-03-06 DIAGNOSIS — C73 PAPILLARY THYROID CARCINOMA: ICD-10-CM

## 2020-03-06 DIAGNOSIS — E89.2 HYPOPARATHYROIDISM AFTER PROCEDURE: ICD-10-CM

## 2020-03-06 DIAGNOSIS — E83.51 HYPOCALCEMIA: ICD-10-CM

## 2020-03-06 LAB
ALBUMIN SERPL BCP-MCNC: 3.9 G/DL (ref 3.5–5.2)
ANION GAP SERPL CALC-SCNC: 8 MMOL/L (ref 8–16)
BUN SERPL-MCNC: 8 MG/DL (ref 6–20)
CALCIUM SERPL-MCNC: 9 MG/DL (ref 8.7–10.5)
CHLORIDE SERPL-SCNC: 105 MMOL/L (ref 95–110)
CO2 SERPL-SCNC: 28 MMOL/L (ref 23–29)
CREAT SERPL-MCNC: 1 MG/DL (ref 0.5–1.4)
EST. GFR  (AFRICAN AMERICAN): >60 ML/MIN/1.73 M^2
EST. GFR  (NON AFRICAN AMERICAN): >60 ML/MIN/1.73 M^2
GLUCOSE SERPL-MCNC: 85 MG/DL (ref 70–110)
PHOSPHATE SERPL-MCNC: 3.9 MG/DL (ref 2.7–4.5)
POTASSIUM SERPL-SCNC: 3.8 MMOL/L (ref 3.5–5.1)
SODIUM SERPL-SCNC: 141 MMOL/L (ref 136–145)

## 2020-03-06 PROCEDURE — 99999 PR PBB SHADOW E&M-EST. PATIENT-LVL III: ICD-10-PCS | Mod: PBBFAC,,, | Performed by: NURSE PRACTITIONER

## 2020-03-06 PROCEDURE — 80069 RENAL FUNCTION PANEL: CPT

## 2020-03-06 PROCEDURE — 99214 OFFICE O/P EST MOD 30 MIN: CPT | Mod: S$PBB,,, | Performed by: NURSE PRACTITIONER

## 2020-03-06 PROCEDURE — 99214 PR OFFICE/OUTPT VISIT, EST, LEVL IV, 30-39 MIN: ICD-10-PCS | Mod: S$PBB,,, | Performed by: NURSE PRACTITIONER

## 2020-03-06 PROCEDURE — 99213 OFFICE O/P EST LOW 20 MIN: CPT | Mod: PBBFAC | Performed by: NURSE PRACTITIONER

## 2020-03-06 PROCEDURE — 99999 PR PBB SHADOW E&M-EST. PATIENT-LVL III: CPT | Mod: PBBFAC,,, | Performed by: NURSE PRACTITIONER

## 2020-03-06 PROCEDURE — 36415 COLL VENOUS BLD VENIPUNCTURE: CPT

## 2020-03-06 RX ORDER — SULFAMETHOXAZOLE AND TRIMETHOPRIM 800; 160 MG/1; MG/1
TABLET ORAL
COMMUNITY
Start: 2020-03-05 | End: 2020-04-13

## 2020-03-06 RX ORDER — FLUCONAZOLE 150 MG/1
TABLET ORAL
COMMUNITY
Start: 2020-03-05 | End: 2020-04-13

## 2020-03-06 RX ORDER — ESZOPICLONE 1 MG/1
TABLET, FILM COATED ORAL
COMMUNITY
Start: 2020-02-24 | End: 2020-04-13

## 2020-03-06 NOTE — PATIENT INSTRUCTIONS
https://www.accessdata.fda.gov/drugsatfda_docs/label/2018/720471p627yux.pdf#page=18    Renal function panel today  TG in July 2020   Continue Synthroid 88 mcg daily  Start taking HCTZ 12.5 mg at night  Eventually, we recommend Natpara once back on market   We will recheck 24 hour urine for calcium and creatinine in 2-3 weeks once she restarts HCTZ

## 2020-03-06 NOTE — ASSESSMENT & PLAN NOTE
-- Due to parathyroid glands were destroyed in her thyroidectomy, most likely PTH will not recover as her surgery has been >1 year.   - At this time treat calcium with medical therapy   TUMS 1000 mg in AM, 1000 in afternoon, and 1500 mg at night and rocaltrol 0.25 mcg in AM and 0.5 mcg in PM. Check renal function panel today. Discussed signs and symptoms of hypercalcemia. Discussed dangers of hypercalcemia.   - Check renal function panel today.  - Some of her symptoms have improved since surgery but she continues to adjust doses of calcium.    - Continue K supplement for hypoK due to HCTZ  - Initial discussion about starting NatPara in the past, but has been place on hold due to current recall. Discussed Natpara risk of osteosarcoma. She agrees to start in the future  - Goal: Ca 8-8.5  - Restart HCTZ 12.5 mg at night. Discussed she must be on for the next 2-3 weeks prior to obtaining 24 hour urine.  - Can consider Forteo while awaiting natpara. Will await more recent labs

## 2020-03-09 ENCOUNTER — PATIENT MESSAGE (OUTPATIENT)
Dept: ENDOCRINOLOGY | Facility: CLINIC | Age: 50
End: 2020-03-09

## 2020-03-13 ENCOUNTER — PATIENT MESSAGE (OUTPATIENT)
Dept: ENDOCRINOLOGY | Facility: CLINIC | Age: 50
End: 2020-03-13

## 2020-03-17 ENCOUNTER — PATIENT MESSAGE (OUTPATIENT)
Dept: ENDOCRINOLOGY | Facility: CLINIC | Age: 50
End: 2020-03-17

## 2020-03-17 DIAGNOSIS — E83.51 HYPOCALCEMIA: Primary | ICD-10-CM

## 2020-03-18 ENCOUNTER — PATIENT MESSAGE (OUTPATIENT)
Dept: ENDOCRINOLOGY | Facility: CLINIC | Age: 50
End: 2020-03-18

## 2020-03-18 ENCOUNTER — LAB VISIT (OUTPATIENT)
Dept: LAB | Facility: HOSPITAL | Age: 50
End: 2020-03-18
Attending: NURSE PRACTITIONER
Payer: OTHER GOVERNMENT

## 2020-03-18 DIAGNOSIS — E83.51 HYPOCALCEMIA: Primary | ICD-10-CM

## 2020-03-18 DIAGNOSIS — E83.51 HYPOCALCEMIA: ICD-10-CM

## 2020-03-18 LAB
ALBUMIN SERPL BCP-MCNC: 4.5 G/DL (ref 3.5–5.2)
ANION GAP SERPL CALC-SCNC: 11 MMOL/L (ref 8–16)
BUN SERPL-MCNC: 12 MG/DL (ref 6–20)
CALCIUM SERPL-MCNC: 9.1 MG/DL (ref 8.7–10.5)
CHLORIDE SERPL-SCNC: 98 MMOL/L (ref 95–110)
CO2 SERPL-SCNC: 28 MMOL/L (ref 23–29)
CREAT SERPL-MCNC: 0.9 MG/DL (ref 0.5–1.4)
EST. GFR  (AFRICAN AMERICAN): >60 ML/MIN/1.73 M^2
EST. GFR  (NON AFRICAN AMERICAN): >60 ML/MIN/1.73 M^2
GLUCOSE SERPL-MCNC: 105 MG/DL (ref 70–110)
PHOSPHATE SERPL-MCNC: 4.2 MG/DL (ref 2.7–4.5)
POTASSIUM SERPL-SCNC: 3.6 MMOL/L (ref 3.5–5.1)
SODIUM SERPL-SCNC: 137 MMOL/L (ref 136–145)

## 2020-03-18 PROCEDURE — 36415 COLL VENOUS BLD VENIPUNCTURE: CPT

## 2020-03-18 PROCEDURE — 80069 RENAL FUNCTION PANEL: CPT

## 2020-04-13 ENCOUNTER — OFFICE VISIT (OUTPATIENT)
Dept: ENDOCRINOLOGY | Facility: CLINIC | Age: 50
End: 2020-04-13
Payer: OTHER GOVERNMENT

## 2020-04-13 DIAGNOSIS — E83.51 HYPOCALCEMIA: ICD-10-CM

## 2020-04-13 DIAGNOSIS — C73 PAPILLARY THYROID CARCINOMA: ICD-10-CM

## 2020-04-13 DIAGNOSIS — R82.994 HYPERCALCIURIA: ICD-10-CM

## 2020-04-13 DIAGNOSIS — E89.2 HYPOPARATHYROIDISM AFTER PROCEDURE: Primary | ICD-10-CM

## 2020-04-13 DIAGNOSIS — E89.0 POSTOPERATIVE HYPOTHYROIDISM: ICD-10-CM

## 2020-04-13 PROCEDURE — 99214 PR OFFICE/OUTPT VISIT, EST, LEVL IV, 30-39 MIN: ICD-10-PCS | Mod: 95,,, | Performed by: INTERNAL MEDICINE

## 2020-04-13 PROCEDURE — 99214 OFFICE O/P EST MOD 30 MIN: CPT | Mod: 95,,, | Performed by: INTERNAL MEDICINE

## 2020-04-13 NOTE — PROGRESS NOTES
Rosita Romano is a 50 y.o. female presenting for follow-up of postoperative hypoparathyroidism, papillary thyroid cancer and hypothyroidism    The patient location is: home  The chief complaint leading to consultation is:  Chief Complaint   Patient presents with    hypoparathyroidism     Visit type: Virtual visit with synchronous audio and video  Each patient to whom he or she provides medical services by telemedicine is:  (1) informed of the relationship between the physician and patient and the respective role of any other health care provider with respect to management of the patient; and (2) notified that he or she may decline to receive medical services by telemedicine and may withdraw from such care at any time.    History of Present Illness  Postsurgical hypoparathyroidism  Developed following total thyroidectomy.  We have had difficulty getting her on doses of calcium and calcitriol that lead to calcium in the goal range  Previous attempts to lower her doses have not been successful     She is taking TUMS 1000 mg in AM, 500 mg in afternoon, and 1500 mg at night and rocaltrol 0.25 mcg in AM and 0.25 mcg in PM.  We have tried to lower to a 1000 mg of times twice a day but she always self increases dose  Since last visit she has resumed HCTZ 12.5 mg nightly.  When taking in the morning she felt her blood pressure was dropping too low but is tolerating it well at night  She was supposed to complete 24 hour urine collection however COVID outbreak happened and she has not done this    Overall symptoms well controlled which she attributes to working from home.  But she continues to have some brain fog and will occasionally get tingling in her lips and muscle spasms     Vit D 1000 iu daily      Initial discussion about starting NatPara in the past, but has been place on hold due to current recall. She is on support group for Natpara.  At her last visit starting Forteo until Natpara was available as discussed.  She  would be interested in this    With regards to her hypothyroidism:   Current medication:   generic Synthroid 88 mcg daily     Takes thyroid medication properly without food first thing in the morning    Overall feeling well on this dose    Lab Results   Component Value Date    TSH 1.998 02/19/2020         With regards to her Papillary thyroid carcinoma  Referred by Dr. John Munoz.   Total thyroidectomy, [facility], [id number] multifocal PTC - 05/31/2018 (see path report in media tab)  Reviewed pathology report with Dr. Aguilar, she has low risk disease based on report. discussed i131 with pt, who does not want i131, since it is low risk disease she does not require i131 treatment.   Thyroglobulin has been undetectable since surgery. Recheck in 3 months.  TSH goal 2 or less. Low normal.     She was told undergo HUNG and was set to be treated but the endocrinologist at [facility] has left. She is taking Synthroid 88 mcg QD- not usually missing doses (missed once this week)- she is taking correctly.      Outside Labs:4/1/19 TG <0.1  TSH 0.036  Free T4 1.29  PTH <6.3  Vit D 25     She is on Ativan PRN for her anxiety but states this has been much better lately      Current Outpatient Medications:     busPIRone (BUSPAR) 5 MG Tab, 7.5 mg. , Disp: , Rfl:     calcitRIOL (ROCALTROL) 0.25 MCG Cap, Take 1 capsule (0.25 mcg total) by mouth 2 (two) times daily., Disp: 60 capsule, Rfl: 11    calcium carbonate (TUMS ULTRA ORAL), Take 1,000 mg by mouth 2 (two) times daily. , Disp: , Rfl:     hydroCHLOROthiazide (HYDRODIURIL) 25 MG tablet, Take 1 tablet (25 mg total) by mouth once daily. (Patient taking differently: Take 12.5 mg by mouth once daily. ), Disp: 30 tablet, Rfl: 11    MINIVELLE 0.1 mg/24 hr PTSW, , Disp: , Rfl:     omeprazole (PRILOSEC) 20 MG capsule, Take 20 mg by mouth once daily., Disp: , Rfl:     potassium chloride SA (K-DUR,KLOR-CON) 20 MEQ tablet, Take 20 mEq by mouth once daily., Disp: , Rfl:     rosuvastatin  (CRESTOR) 10 MG tablet, Take 20 mg by mouth once daily. , Disp: , Rfl:     SYNTHROID 88 mcg tablet, Take 1 tablet (88 mcg total) by mouth before breakfast., Disp: 30 tablet, Rfl: 11    vitamin D (VITAMIN D3) 1000 units Tab, , Disp: , Rfl:     Review of Systems   Constitutional: Negative for activity change and unexpected weight change.   Respiratory: Negative for shortness of breath.    Cardiovascular: Negative for chest pain and leg swelling.   Gastrointestinal: Negative for abdominal pain.   Genitourinary: Negative for dysuria.   Skin: Negative for rash.   Neurological: Negative for headaches.   Psychiatric/Behavioral: Negative for confusion.       Objective:     Wt Readings from Last 3 Encounters:   03/06/20 71.6 kg (157 lb 15.4 oz)   12/30/19 72.2 kg (159 lb 2.8 oz)   10/10/19 71.2 kg (157 lb)         LABS    Chemistry        Component Value Date/Time     03/18/2020 0840    K 3.6 03/18/2020 0840    CL 98 03/18/2020 0840    CO2 28 03/18/2020 0840    BUN 12 03/18/2020 0840    CREATININE 0.9 03/18/2020 0840     03/18/2020 0840        Component Value Date/Time    CALCIUM 9.1 03/18/2020 0840    ALKPHOS 60 02/19/2020 1258    AST 22 02/19/2020 1258    ALT 19 02/19/2020 1258    BILITOT 0.8 02/19/2020 1258    ESTGFRAFRICA >60.0 03/18/2020 0840    EGFRNONAA >60.0 03/18/2020 0840              Assessment and Plan     Hypoparathyroidism after procedure  Suspect PTH will not recover a surgery greater than a year ago  I think she would benefit from therapy with teriparatide however discussed that we would need to monitor labs frequently when starting and given current COVID-19 situation would advise we wait until safer to have regular labs  She is agreeable to this plan  At this time treat calcium with medical therapy   TUMS 1000 mg in AM, 500 in afternoon, and 1500 mg at night and rocaltrol 0.25 mcg BID.   Continue HCTZ 12.5 mg nightly  Will check labs including 24 hour urine collection in three months  Plan to  pursue treatment with Forteo around the time of her next visit    Papillary thyroid carcinoma  Reviewed pathology, she has low risk disease based on report.  Treatment with i131 discussed in the past with pt, who does not want i131 and since it is low risk disease she does not require i131 treatment  - thyroglobulin has been undetectable since surgery. Recheck in yearly due in 7/2020  - TSH goal 2 or less     Postoperative hypothyroidism  Clinically and biochemically euthyroid.  Continue levothyroxine 88 mcg daily  Check TSH in 7/2020 with titration pending results    Hypercalciuria  See above    Hypocalcemia  See above        RTC four months with labs before visit    Amelia Prado MD

## 2020-04-13 NOTE — ASSESSMENT & PLAN NOTE
Clinically and biochemically euthyroid.  Continue levothyroxine 88 mcg daily  Check TSH in 7/2020 with titration pending results

## 2020-04-13 NOTE — ASSESSMENT & PLAN NOTE
Reviewed pathology, she has low risk disease based on report.  Treatment with i131 discussed in the past with pt, who does not want i131 and since it is low risk disease she does not require i131 treatment  - thyroglobulin has been undetectable since surgery. Recheck in yearly due in 7/2020  - TSH goal 2 or less

## 2020-04-13 NOTE — ASSESSMENT & PLAN NOTE
Suspect PTH will not recover a surgery greater than a year ago  I think she would benefit from therapy with teriparatide however discussed that we would need to monitor labs frequently when starting and given current COVID-19 situation would advise we wait until safer to have regular labs  She is agreeable to this plan  At this time treat calcium with medical therapy   TUMS 1000 mg in AM, 500 in afternoon, and 1500 mg at night and rocaltrol 0.25 mcg BID.   Continue HCTZ 12.5 mg nightly  Will check labs including 24 hour urine collection in three months  Plan to pursue treatment with Forteo around the time of her next visit

## 2020-05-01 ENCOUNTER — PATIENT MESSAGE (OUTPATIENT)
Dept: ENDOCRINOLOGY | Facility: CLINIC | Age: 50
End: 2020-05-01

## 2020-05-01 DIAGNOSIS — E83.51 HYPOCALCEMIA: ICD-10-CM

## 2020-05-01 DIAGNOSIS — E89.0 POSTOPERATIVE HYPOTHYROIDISM: ICD-10-CM

## 2020-05-01 RX ORDER — CALCITRIOL 0.25 UG/1
0.25 CAPSULE ORAL 2 TIMES DAILY
Qty: 60 CAPSULE | Refills: 11 | Status: SHIPPED | OUTPATIENT
Start: 2020-05-01 | End: 2020-09-08

## 2020-05-26 ENCOUNTER — PATIENT MESSAGE (OUTPATIENT)
Dept: ENDOCRINOLOGY | Facility: CLINIC | Age: 50
End: 2020-05-26

## 2020-06-08 ENCOUNTER — LAB VISIT (OUTPATIENT)
Dept: LAB | Facility: HOSPITAL | Age: 50
End: 2020-06-08
Attending: INTERNAL MEDICINE
Payer: OTHER GOVERNMENT

## 2020-06-08 ENCOUNTER — PATIENT MESSAGE (OUTPATIENT)
Dept: ENDOCRINOLOGY | Facility: CLINIC | Age: 50
End: 2020-06-08

## 2020-06-08 DIAGNOSIS — E83.51 HYPOCALCEMIA: Primary | ICD-10-CM

## 2020-06-08 DIAGNOSIS — C73 PAPILLARY THYROID CARCINOMA: ICD-10-CM

## 2020-06-08 DIAGNOSIS — R53.1 WEAKNESS: ICD-10-CM

## 2020-06-08 DIAGNOSIS — R82.994 HYPERCALCIURIA: ICD-10-CM

## 2020-06-08 DIAGNOSIS — E83.51 HYPOCALCEMIA: ICD-10-CM

## 2020-06-08 DIAGNOSIS — E83.51 IATROGENIC HYPOCALCEMIA: Chronic | ICD-10-CM

## 2020-06-08 LAB
25(OH)D3+25(OH)D2 SERPL-MCNC: 48 NG/ML (ref 30–96)
ALBUMIN SERPL BCP-MCNC: 4.5 G/DL (ref 3.5–5.2)
ANION GAP SERPL CALC-SCNC: 11 MMOL/L (ref 8–16)
BUN SERPL-MCNC: 11 MG/DL (ref 6–20)
CALCIUM SERPL-MCNC: 8.6 MG/DL (ref 8.7–10.5)
CHLORIDE SERPL-SCNC: 100 MMOL/L (ref 95–110)
CO2 SERPL-SCNC: 28 MMOL/L (ref 23–29)
CREAT SERPL-MCNC: 0.8 MG/DL (ref 0.5–1.4)
EST. GFR  (AFRICAN AMERICAN): >60 ML/MIN/1.73 M^2
EST. GFR  (NON AFRICAN AMERICAN): >60 ML/MIN/1.73 M^2
GLUCOSE SERPL-MCNC: 91 MG/DL (ref 70–110)
MAGNESIUM SERPL-MCNC: 1.9 MG/DL (ref 1.6–2.6)
PHOSPHATE SERPL-MCNC: 3.8 MG/DL (ref 2.7–4.5)
POTASSIUM SERPL-SCNC: 3.5 MMOL/L (ref 3.5–5.1)
SODIUM SERPL-SCNC: 139 MMOL/L (ref 136–145)
TSH SERPL DL<=0.005 MIU/L-ACNC: 1.51 UIU/ML (ref 0.34–5.6)

## 2020-06-08 PROCEDURE — 84432 ASSAY OF THYROGLOBULIN: CPT

## 2020-06-08 PROCEDURE — 82570 ASSAY OF URINE CREATININE: CPT

## 2020-06-08 PROCEDURE — 82306 VITAMIN D 25 HYDROXY: CPT

## 2020-06-08 PROCEDURE — 83735 ASSAY OF MAGNESIUM: CPT

## 2020-06-08 PROCEDURE — 36415 COLL VENOUS BLD VENIPUNCTURE: CPT

## 2020-06-08 PROCEDURE — 82340 ASSAY OF CALCIUM IN URINE: CPT

## 2020-06-08 PROCEDURE — 84443 ASSAY THYROID STIM HORMONE: CPT

## 2020-06-08 PROCEDURE — 80069 RENAL FUNCTION PANEL: CPT

## 2020-06-09 LAB
CALCIUM 24H UR-MRATE: 15 MG/HR (ref 4–12)
CALCIUM UR-MCNC: 15 MG/DL (ref 0–15)
CALCIUM URINE (MG/SPEC): 349 MG/SPEC
CREAT 24H UR-MRATE: 58.2 MG/HR (ref 40–75)
CREAT UR-MCNC: 60.1 MG/DL (ref 15–325)
CREATININE, URINE (MG/SPEC): 1397.3 MG/SPEC
URINE COLLECTION DURATION: 24 HR
URINE COLLECTION DURATION: 24 HR
URINE VOLUME: 2325 ML
URINE VOLUME: 2325 ML

## 2020-06-10 ENCOUNTER — PATIENT MESSAGE (OUTPATIENT)
Dept: ENDOCRINOLOGY | Facility: CLINIC | Age: 50
End: 2020-06-10

## 2020-07-06 ENCOUNTER — PATIENT MESSAGE (OUTPATIENT)
Dept: ENDOCRINOLOGY | Facility: CLINIC | Age: 50
End: 2020-07-06

## 2020-07-06 DIAGNOSIS — E83.51 HYPOCALCEMIA: Primary | ICD-10-CM

## 2020-07-06 DIAGNOSIS — E89.0 POSTOPERATIVE HYPOTHYROIDISM: ICD-10-CM

## 2020-07-06 DIAGNOSIS — R53.1 WEAKNESS GENERALIZED: ICD-10-CM

## 2020-07-06 DIAGNOSIS — E83.51 IATROGENIC HYPOCALCEMIA: ICD-10-CM

## 2020-07-06 DIAGNOSIS — E89.2 HYPOPARATHYROIDISM AFTER PROCEDURE: ICD-10-CM

## 2020-07-06 DIAGNOSIS — E20.89 OTHER HYPOPARATHYROIDISM: ICD-10-CM

## 2020-07-07 RX ORDER — TERIPARATIDE 250 UG/ML
20 INJECTION, SOLUTION SUBCUTANEOUS 2 TIMES DAILY
Qty: 4.8 ML | Refills: 11 | Status: SHIPPED | OUTPATIENT
Start: 2020-07-07 | End: 2020-07-31

## 2020-07-07 NOTE — TELEPHONE ENCOUNTER
Received a message from patient request to start Forteo treatment for her hypocalcemia, hypoparathyroidism, along with symptoms of fatigue, weakness, joints pain and difficulty sleeping.  Patient reports overall poor quality of life.      Discussed with Dr. Prado  Order place for Forteo 20 mcg twice a day  Sent to [facility]      Message sent to patient, to advised of the plan.      Param Salcedo M.D  Endocrinology fellow

## 2020-07-08 ENCOUNTER — TELEPHONE (OUTPATIENT)
Dept: PHARMACY | Facility: CLINIC | Age: 50
End: 2020-07-08

## 2020-07-29 NOTE — TELEPHONE ENCOUNTER
DOCUMENTATION ONLY:  Appeal for Forteo approved from 7/29/20 to 7/29/22    Estimated co-pay: $29.00

## 2020-07-31 ENCOUNTER — TELEPHONE (OUTPATIENT)
Dept: ENDOCRINOLOGY | Facility: CLINIC | Age: 50
End: 2020-07-31

## 2020-07-31 DIAGNOSIS — E89.2 HYPOPARATHYROIDISM AFTER PROCEDURE: ICD-10-CM

## 2020-07-31 DIAGNOSIS — E20.89 OTHER HYPOPARATHYROIDISM: ICD-10-CM

## 2020-07-31 DIAGNOSIS — E83.51 IATROGENIC HYPOCALCEMIA: ICD-10-CM

## 2020-07-31 DIAGNOSIS — E83.51 HYPOCALCEMIA: Primary | ICD-10-CM

## 2020-07-31 DIAGNOSIS — R53.1 WEAKNESS GENERALIZED: ICD-10-CM

## 2020-07-31 RX ORDER — TERIPARATIDE 250 UG/ML
20 INJECTION, SOLUTION SUBCUTANEOUS 2 TIMES DAILY
Qty: 4.8 ML | Refills: 11 | Status: SHIPPED | OUTPATIENT
Start: 2020-07-31 | End: 2020-08-21

## 2020-07-31 NOTE — TELEPHONE ENCOUNTER
"----- Message from June Dailey sent at 7/29/2020  1:32 PM CDT -----  Regarding: Forteo  FYI:  Forteo appeal has been approved through 7/29/22.  Patient's insurance requires the patient to fill through [facility].  Please send prescription to [facility], which has been added to the patient's EPIC profile.  Patient has been notified and provided with the necessary info to call and schedule a delivery.    To complete this in EPIC, the original order MUST be discontinued and re-typed as a new prescription with the updated pharmacy listed.  Clicking "reorder" will continue to route the rx to [facility] even if the pharmacy is changed.  Please opt the patient out of [facility] when the BPA is fired.      "

## 2020-08-07 ENCOUNTER — PATIENT MESSAGE (OUTPATIENT)
Dept: ENDOCRINOLOGY | Facility: CLINIC | Age: 50
End: 2020-08-07

## 2020-08-09 ENCOUNTER — PATIENT MESSAGE (OUTPATIENT)
Dept: ENDOCRINOLOGY | Facility: CLINIC | Age: 50
End: 2020-08-09

## 2020-08-10 RX ORDER — PEN NEEDLE, DIABETIC 30 GX3/16"
NEEDLE, DISPOSABLE MISCELLANEOUS
Qty: 100 EACH | Refills: 11 | Status: SHIPPED | OUTPATIENT
Start: 2020-08-10 | End: 2021-04-20

## 2020-08-12 ENCOUNTER — LAB VISIT (OUTPATIENT)
Dept: LAB | Facility: HOSPITAL | Age: 50
End: 2020-08-12
Attending: HOSPITALIST
Payer: OTHER GOVERNMENT

## 2020-08-12 DIAGNOSIS — E83.51 HYPOCALCEMIA: ICD-10-CM

## 2020-08-12 DIAGNOSIS — R53.1 WEAKNESS GENERALIZED: ICD-10-CM

## 2020-08-12 LAB
ALBUMIN SERPL BCP-MCNC: 4.2 G/DL (ref 3.5–5.2)
ANION GAP SERPL CALC-SCNC: 9 MMOL/L (ref 8–16)
BUN SERPL-MCNC: 10 MG/DL (ref 6–20)
CALCIUM SERPL-MCNC: 9.8 MG/DL (ref 8.7–10.5)
CHLORIDE SERPL-SCNC: 102 MMOL/L (ref 95–110)
CO2 SERPL-SCNC: 28 MMOL/L (ref 23–29)
CREAT SERPL-MCNC: 0.7 MG/DL (ref 0.5–1.4)
EST. GFR  (AFRICAN AMERICAN): >60 ML/MIN/1.73 M^2
EST. GFR  (NON AFRICAN AMERICAN): >60 ML/MIN/1.73 M^2
GLUCOSE SERPL-MCNC: 91 MG/DL (ref 70–110)
PHOSPHATE SERPL-MCNC: 3 MG/DL (ref 2.7–4.5)
POTASSIUM SERPL-SCNC: 3.7 MMOL/L (ref 3.5–5.1)
SODIUM SERPL-SCNC: 139 MMOL/L (ref 136–145)

## 2020-08-12 PROCEDURE — 36415 COLL VENOUS BLD VENIPUNCTURE: CPT

## 2020-08-12 PROCEDURE — 80069 RENAL FUNCTION PANEL: CPT

## 2020-08-22 ENCOUNTER — LAB VISIT (OUTPATIENT)
Dept: LAB | Facility: HOSPITAL | Age: 50
End: 2020-08-22
Attending: ANESTHESIOLOGY
Payer: OTHER GOVERNMENT

## 2020-08-22 DIAGNOSIS — E83.51 HYPOCALCEMIA: ICD-10-CM

## 2020-08-22 LAB
ALBUMIN SERPL BCP-MCNC: 4.2 G/DL (ref 3.5–5.2)
ANION GAP SERPL CALC-SCNC: 7 MMOL/L (ref 8–16)
BUN SERPL-MCNC: 9 MG/DL (ref 6–20)
CALCIUM SERPL-MCNC: 8.6 MG/DL (ref 8.7–10.5)
CHLORIDE SERPL-SCNC: 105 MMOL/L (ref 95–110)
CO2 SERPL-SCNC: 27 MMOL/L (ref 23–29)
CREAT SERPL-MCNC: 0.9 MG/DL (ref 0.5–1.4)
EST. GFR  (AFRICAN AMERICAN): >60 ML/MIN/1.73 M^2
EST. GFR  (NON AFRICAN AMERICAN): >60 ML/MIN/1.73 M^2
GLUCOSE SERPL-MCNC: 81 MG/DL (ref 70–110)
PHOSPHATE SERPL-MCNC: 3.8 MG/DL (ref 2.7–4.5)
POTASSIUM SERPL-SCNC: 4 MMOL/L (ref 3.5–5.1)
SODIUM SERPL-SCNC: 139 MMOL/L (ref 136–145)

## 2020-08-22 PROCEDURE — 36415 COLL VENOUS BLD VENIPUNCTURE: CPT

## 2020-08-22 PROCEDURE — 80069 RENAL FUNCTION PANEL: CPT

## 2020-09-08 ENCOUNTER — OFFICE VISIT (OUTPATIENT)
Dept: ENDOCRINOLOGY | Facility: CLINIC | Age: 50
End: 2020-09-08
Payer: OTHER GOVERNMENT

## 2020-09-08 DIAGNOSIS — E89.2 HYPOPARATHYROIDISM AFTER PROCEDURE: ICD-10-CM

## 2020-09-08 DIAGNOSIS — E83.51 HYPOCALCEMIA: Primary | Chronic | ICD-10-CM

## 2020-09-08 DIAGNOSIS — E87.6 HYPOKALEMIA: ICD-10-CM

## 2020-09-08 DIAGNOSIS — R53.83 FATIGUE, UNSPECIFIED TYPE: ICD-10-CM

## 2020-09-08 DIAGNOSIS — R82.994 HYPERCALCIURIA: ICD-10-CM

## 2020-09-08 DIAGNOSIS — E89.0 POSTOPERATIVE HYPOTHYROIDISM: ICD-10-CM

## 2020-09-08 DIAGNOSIS — C73 PAPILLARY THYROID CARCINOMA: ICD-10-CM

## 2020-09-08 PROCEDURE — 99214 OFFICE O/P EST MOD 30 MIN: CPT | Mod: 95,,, | Performed by: HOSPITALIST

## 2020-09-08 PROCEDURE — 99214 PR OFFICE/OUTPT VISIT, EST, LEVL IV, 30-39 MIN: ICD-10-PCS | Mod: 95,,, | Performed by: HOSPITALIST

## 2020-09-08 NOTE — PROGRESS NOTES
Subjective:      Patient ID: Rosita Romano is a 50 y.o. female presented to Endocrinology clinic on 9/8/2020.    Chief Complaint:  Hypocalcemia    History of Present Illness: Rosita Romano is a 50 y.o. female with history of papillary thyroid cancer status post thyroidectomy, with subsequent hypothyroidism and hypoparathyroidism complicated by symptomatic hypocalcemia.  Patient is here for virtual visit follow-up      Postsurgical hypoparathyroidism  Developed following total thyroidectomy in 2018, subsequent PTH have not recovered.  Patient reports quality of life symptoms, including often on muscle spasming, tingling of her face, fatigue, and anxiety.  Prior was on Tums, Calcitriol, and HCTZ  Unable to get NatPara due to the current recall  (2019-current)    Started on Forteo 20 mcg BID injections on 08/05/2020>> patient reports improvement in most symptoms, overall still have some off and on symptoms  Currently not taking HCTZ or Calcitriol  Currently:  Tums to 750 mg twice a day and Vit D 1000 IU daily    Been on Forteo for 4 weeks: report joint pain, tingling of nose periodically  Improve in fatigue>> but now slight worsening  Rotating injection sites not having redness    Improve with stomach issue, since decreasing calcium and calcitriol intake       With regards to her hypothyroidism:   Current medication:   (name brand) Synthroid 88 mcg daily, at 5:30 AM, take Tums at 9:30AM     Takes thyroid medication properly without food first thing in the morning   Overall feeling well on this dose    TSH   Date Value Ref Range Status   06/08/2020 1.513 0.340 - 5.600 uIU/mL Final     Free T4   Date Value Ref Range Status   02/19/2020 1.11 0.71 - 1.51 ng/dL Final     Thyroglobulin Antibody Screen   Date Value Ref Range Status   06/08/2020 <1.8 <1.8 IU/mL Final       With regards to her Papillary thyroid carcinoma  Referred by Dr. John Munoz.   Total thyroidectomy, [facility], [id number] multifocal PTC - 05/31/2018 (see  path report in media tab)  Reviewed pathology report with Dr. Aguilar, she has low risk disease based on report. discussed i131 with pt, who does not want i131, since it is low risk disease she does not require i131 treatment.   Thyroglobulin has been undetectable since surgery.   TSH goal 2 or less. Low normal.      Not usually missing doses (missed once this week)- she is taking correctly.     She is on Ativan PRN for her anxiety but states this has been much better lately  Reports insomnia    Reviewed past surgical, medical, family, social history and updated as appropriate.    Review of Systems   Constitutional: Positive for fatigue. Negative for activity change, diaphoresis and unexpected weight change.   HENT: Negative for sore throat and voice change.    Eyes: Negative for visual disturbance.   Respiratory: Negative for shortness of breath.    Cardiovascular: Negative for chest pain.   Gastrointestinal: Negative for abdominal pain, constipation, diarrhea, nausea and vomiting.   Genitourinary: Negative for urgency.   Musculoskeletal: Positive for myalgias. Negative for arthralgias.   Skin: Negative for wound.   Neurological: Negative for headaches.   Psychiatric/Behavioral: Negative for confusion and sleep disturbance. The patient is nervous/anxious.        Objective:     Physical Exam  Constitutional:       Comments: Physical exam and Vital signs were not done, as this is a virtual visit.       Lab Review:   No results found for: HGBA1C    No results found for: CHOL, HDL, LDLCALC, TRIG, CHOLHDL    Lab Results   Component Value Date     08/22/2020    K 4.0 08/22/2020     08/22/2020    CO2 27 08/22/2020    GLU 81 08/22/2020    BUN 9 08/22/2020    CREATININE 0.9 08/22/2020    CALCIUM 8.6 (L) 08/22/2020    PROT 8.1 02/19/2020    ALBUMIN 4.2 08/22/2020    BILITOT 0.8 02/19/2020    ALKPHOS 60 02/19/2020    AST 22 02/19/2020    ALT 19 02/19/2020    ANIONGAP 7 (L) 08/22/2020    ESTGFRAFRICA >60.0 08/22/2020     EGFRNONAA >60.0 08/22/2020    TSH 1.513 06/08/2020        Lab Results   Component Value Date    PTH <5.0 (L) 02/19/2020    PTH <5.0 (L) 10/10/2019    PTH <5.0 (L) 08/15/2019    NORELVBH70DR 48 06/08/2020    TWPSBMIB22GT 41 07/29/2019    CALCIUM 8.6 (L) 08/22/2020    CALCIUM 9.8 08/12/2020    CALCIUM 8.6 (L) 06/08/2020    PHOS 3.8 08/22/2020    PHOS 3.0 08/12/2020    PHOS 3.8 06/08/2020    ALKPHOS 60 02/19/2020    ALKPHOS 95 10/10/2019    TSH 1.513 06/08/2020    TTGIGA 7 08/15/2019    LABCALC 15.0 06/08/2020    DFSFNUT22EVY 15 (H) 06/08/2020     Outside Labs:4/1/19 TG <0.1  TSH 0.036  Free T4 1.29  PTH <6.3  Vit D 25    Assessment and Plan     Hypocalcemia  Suspect PTH will not recover  Improvement in some of her symptoms at this time, reports improvement in quality of life  On Forteo 20 mcg b.i.d. injection (approved until 2022)  On the Tums 750 mg b.i.d (patient to consider calcium citrate to see if that improves her abdominal symptoms)  Will check labs including 24 hour urine collection and renal panel      Hypercalciuria  Repeat 24 hr urine calcium collection    Hypokalemia  Resolved    Hypoparathyroidism after procedure  see hypocalcemia plan as above  On Forteo injections    Papillary thyroid carcinoma  Reviewed pathology, she has low risk disease based on report.  Treatment with i131 discussed in the past with pt, who does not want i131 and since it is low risk disease she does not require i131 treatment  - thyroglobulin has been undetectable since surgery. Recheck in yearly  - TSH goal 2 or less     Postoperative hypothyroidism  Clinically and biochemically euthyroid.  Continue levothyroxine 88 mcg daily  Check TSH with next labs with titration pending results      Lab work soon  RTC in 6 months    Param Salcedo MD  Endocrinology Fellow  9/8/2020 11:41 AM      The patient location is: Home located in [place]  The chief complaint leading to consultation is:  Follow-up of  hypothyroid/hypoparathyroid/hypocalcemia  Visit type: Virtual visit with synchronous audio and video  Total time spent with patient:  30 mins  Each patient to whom he or she provides medical services by telemedicine is:  (1) informed of the relationship between the physician and patient and the respective role of any other health care provider with respect to management of the patient; and (2) notified that he or she may decline to receive medical services by telemedicine and may withdraw from such care at any time.      ICheyenne MD,  have personally taken the history  and agree with the resident's note as stated above.

## 2020-09-08 NOTE — ASSESSMENT & PLAN NOTE
Clinically and biochemically euthyroid.  Continue levothyroxine 88 mcg daily  Check TSH with next labs with titration pending results

## 2020-09-08 NOTE — ASSESSMENT & PLAN NOTE
Reviewed pathology, she has low risk disease based on report.  Treatment with i131 discussed in the past with pt, who does not want i131 and since it is low risk disease she does not require i131 treatment  - thyroglobulin has been undetectable since surgery. Recheck in yearly  - TSH goal 2 or less

## 2020-09-08 NOTE — ASSESSMENT & PLAN NOTE
Suspect PTH will not recover  Improvement in some of her symptoms at this time, reports improvement in quality of life  On Forteo 20 mcg b.i.d. injection (approved until 2022)  On the Tums 750 mg b.i.d (patient to consider calcium citrate to see if that improves her abdominal symptoms)  Will check labs including 24 hour urine collection and renal panel

## 2020-09-13 ENCOUNTER — NURSE TRIAGE (OUTPATIENT)
Dept: ADMINISTRATIVE | Facility: CLINIC | Age: 50
End: 2020-09-13
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
## 2020-09-16 ENCOUNTER — TELEPHONE (OUTPATIENT)
Dept: ENDOCRINOLOGY | Facility: HOSPITAL | Age: 50
End: 2020-09-16

## 2020-09-16 ENCOUNTER — LAB VISIT (OUTPATIENT)
Dept: LAB | Facility: HOSPITAL | Age: 50
End: 2020-09-16
Attending: HOSPITALIST
Payer: OTHER GOVERNMENT

## 2020-09-16 DIAGNOSIS — E83.51 HYPOCALCEMIA: ICD-10-CM

## 2020-09-16 DIAGNOSIS — E89.0 POSTOPERATIVE HYPOTHYROIDISM: ICD-10-CM

## 2020-09-16 DIAGNOSIS — E83.51 HYPOCALCEMIA: Primary | ICD-10-CM

## 2020-09-16 DIAGNOSIS — E89.2 HYPOPARATHYROIDISM AFTER PROCEDURE: Primary | ICD-10-CM

## 2020-09-16 LAB
ALBUMIN SERPL BCP-MCNC: 4.6 G/DL (ref 3.5–5.2)
ANION GAP SERPL CALC-SCNC: 11 MMOL/L (ref 8–16)
BUN SERPL-MCNC: 9 MG/DL (ref 6–20)
CALCIUM SERPL-MCNC: 11.4 MG/DL (ref 8.7–10.5)
CHLORIDE SERPL-SCNC: 101 MMOL/L (ref 95–110)
CO2 SERPL-SCNC: 28 MMOL/L (ref 23–29)
CREAT SERPL-MCNC: 0.9 MG/DL (ref 0.5–1.4)
EST. GFR  (AFRICAN AMERICAN): >60 ML/MIN/1.73 M^2
EST. GFR  (NON AFRICAN AMERICAN): >60 ML/MIN/1.73 M^2
GLUCOSE SERPL-MCNC: 110 MG/DL (ref 70–110)
PHOSPHATE SERPL-MCNC: 3.9 MG/DL (ref 2.7–4.5)
POTASSIUM SERPL-SCNC: 3.9 MMOL/L (ref 3.5–5.1)
SODIUM SERPL-SCNC: 140 MMOL/L (ref 136–145)
T4 FREE SERPL-MCNC: 1.09 NG/DL (ref 0.71–1.51)
TSH SERPL DL<=0.005 MIU/L-ACNC: 3.63 UIU/ML (ref 0.34–5.6)

## 2020-09-16 PROCEDURE — 36415 COLL VENOUS BLD VENIPUNCTURE: CPT

## 2020-09-16 PROCEDURE — 80069 RENAL FUNCTION PANEL: CPT

## 2020-09-16 PROCEDURE — 84443 ASSAY THYROID STIM HORMONE: CPT

## 2020-09-16 PROCEDURE — 84439 ASSAY OF FREE THYROXINE: CPT

## 2020-09-16 NOTE — TELEPHONE ENCOUNTER
----- Message from Nessa Yuan MA sent at 9/16/2020  8:12 AM CDT -----  Regarding: FW: Lab Orders  Contact: Patient  Good morning Dr Salcedo can you please sign orders for patient's lab. Patient call and would to have her labs done today. Thank you and have a great day.    Dedi  ----- Message -----  From: Radha White RN  Sent: 9/15/2020   2:48 PM CDT  To: Nessa Yuan MA  Subject: FW: Lab Orders                                     ----- Message -----  From: Bere Addison  Sent: 9/15/2020   1:47 PM CDT  To: Denisse Virgen Staff  Subject: Lab Orders                                       Type: Needs Medical Advice    Who Called:  Patient    Best Call Back Number: [phone]    Additional Information:   Pt has lab appt 9/21, cancelled and now will not allow rescheduling.  Pt requested labs be reentered so she can schedule. Thank you!

## 2020-09-17 ENCOUNTER — PATIENT MESSAGE (OUTPATIENT)
Dept: ENDOCRINOLOGY | Facility: CLINIC | Age: 50
End: 2020-09-17

## 2020-09-17 DIAGNOSIS — E83.51 HYPOCALCEMIA: Primary | ICD-10-CM

## 2020-09-17 DIAGNOSIS — E89.0 POSTOPERATIVE HYPOTHYROIDISM: ICD-10-CM

## 2020-09-17 RX ORDER — LEVOTHYROXINE SODIUM 88 UG/1
88 TABLET ORAL
Qty: 32 TABLET | Refills: 11 | Status: SHIPPED | OUTPATIENT
Start: 2020-09-17 | End: 2020-12-17 | Stop reason: SDUPTHER

## 2020-09-17 NOTE — TELEPHONE ENCOUNTER
I called and spoke to patient on on 9/17/2020 5:04 PM to review recent result.  Lab work were reviewed and discuss with Dr. Prado  I discussed the recent hypercalcemia    Plan to  Lower Tums to 500 mg twice a day  Continue Forteo injection twice a day  Okay with patient taking dietary calcium  Reassured patient of her chronic symptoms might not be related to calcium level    Repeat lab work next week    With regards to hypothyroidism  TSH is above goal  Patient with symptoms of chronic fatigue, plus her concern about thyroid level in her message  Advised patient to increase Synthroid to 1.5 pill on Sunday, 1 pill daily from Monday to Saturday  Repeat lab work in 2 months    With reassurance given to the patient, all questions were answered.    Param Salcedo MD  Endocrinology Fellow  9/17/2020

## 2020-09-18 ENCOUNTER — PATIENT MESSAGE (OUTPATIENT)
Dept: ENDOCRINOLOGY | Facility: CLINIC | Age: 50
End: 2020-09-18

## 2020-09-18 DIAGNOSIS — E89.0 POSTOPERATIVE HYPOTHYROIDISM: Primary | ICD-10-CM

## 2020-09-18 DIAGNOSIS — E89.2 HYPOPARATHYROIDISM AFTER PROCEDURE: ICD-10-CM

## 2020-09-18 DIAGNOSIS — E83.42 HYPOMAGNESEMIA: ICD-10-CM

## 2020-09-18 DIAGNOSIS — E83.51 HYPOCALCEMIA: ICD-10-CM

## 2020-09-22 ENCOUNTER — PATIENT MESSAGE (OUTPATIENT)
Dept: ENDOCRINOLOGY | Facility: CLINIC | Age: 50
End: 2020-09-22

## 2020-09-22 ENCOUNTER — LAB VISIT (OUTPATIENT)
Dept: LAB | Facility: HOSPITAL | Age: 50
End: 2020-09-22
Attending: HOSPITALIST
Payer: OTHER GOVERNMENT

## 2020-09-22 DIAGNOSIS — E83.51 HYPOCALCEMIA: ICD-10-CM

## 2020-09-22 LAB
ALBUMIN SERPL BCP-MCNC: 4.4 G/DL (ref 3.5–5.2)
ANION GAP SERPL CALC-SCNC: 13 MMOL/L (ref 8–16)
BUN SERPL-MCNC: 14 MG/DL (ref 6–20)
CALCIUM SERPL-MCNC: 8.5 MG/DL (ref 8.7–10.5)
CHLORIDE SERPL-SCNC: 100 MMOL/L (ref 95–110)
CO2 SERPL-SCNC: 27 MMOL/L (ref 23–29)
CREAT SERPL-MCNC: 0.9 MG/DL (ref 0.5–1.4)
EST. GFR  (AFRICAN AMERICAN): >60 ML/MIN/1.73 M^2
EST. GFR  (NON AFRICAN AMERICAN): >60 ML/MIN/1.73 M^2
GLUCOSE SERPL-MCNC: 97 MG/DL (ref 70–110)
PHOSPHATE SERPL-MCNC: 5 MG/DL (ref 2.7–4.5)
POTASSIUM SERPL-SCNC: 4 MMOL/L (ref 3.5–5.1)
SODIUM SERPL-SCNC: 140 MMOL/L (ref 136–145)

## 2020-09-22 PROCEDURE — 80069 RENAL FUNCTION PANEL: CPT

## 2020-09-22 PROCEDURE — 36415 COLL VENOUS BLD VENIPUNCTURE: CPT

## 2020-09-22 RX ORDER — LANOLIN ALCOHOL/MO/W.PET/CERES
400 CREAM (GRAM) TOPICAL DAILY
Qty: 30 TABLET | Refills: 11 | Status: SHIPPED | OUTPATIENT
Start: 2020-09-22

## 2020-09-22 RX ORDER — CALCITRIOL 0.5 UG/1
0.5 CAPSULE ORAL 2 TIMES DAILY
Qty: 60 CAPSULE | Refills: 11
Start: 2020-09-22 | End: 2020-11-16 | Stop reason: SDUPTHER

## 2020-09-22 NOTE — TELEPHONE ENCOUNTER
Received and reviewed to patient message and emergency room visit note/lab work  Potassium 3.2  Calcium 8.7  Magnesium 1.5    Repeat lab work today show calcium 8.5, potassium of 4.0, phosphate 5.0  Off Forteo, per patient request    Plan  - Tums 1000 mg 3 times a day (similar to previously scheduled)  - continue calcitriol 0.5 mcg twice a day  - continue potassium supplement,  - advise magnesium supplementation, magnesium oxide 400 mg daily  - repeat lab work in 1 week    Virtual visit in October with Dr Sam Salcedo M.D.  Endocrinology  9/22/2020

## 2020-09-23 ENCOUNTER — TELEPHONE (OUTPATIENT)
Dept: ENDOCRINOLOGY | Facility: CLINIC | Age: 50
End: 2020-09-23

## 2020-09-29 ENCOUNTER — LAB VISIT (OUTPATIENT)
Dept: LAB | Facility: HOSPITAL | Age: 50
End: 2020-09-29
Attending: HOSPITALIST
Payer: OTHER GOVERNMENT

## 2020-09-29 DIAGNOSIS — E83.51 HYPOCALCEMIA: ICD-10-CM

## 2020-09-29 LAB
ALBUMIN SERPL BCP-MCNC: 4.5 G/DL (ref 3.5–5.2)
ANION GAP SERPL CALC-SCNC: 10 MMOL/L (ref 8–16)
BUN SERPL-MCNC: 12 MG/DL (ref 6–20)
CALCIUM SERPL-MCNC: 8.8 MG/DL (ref 8.7–10.5)
CHLORIDE SERPL-SCNC: 98 MMOL/L (ref 95–110)
CO2 SERPL-SCNC: 29 MMOL/L (ref 23–29)
CREAT SERPL-MCNC: 0.8 MG/DL (ref 0.5–1.4)
EST. GFR  (AFRICAN AMERICAN): >60 ML/MIN/1.73 M^2
EST. GFR  (NON AFRICAN AMERICAN): >60 ML/MIN/1.73 M^2
GLUCOSE SERPL-MCNC: 91 MG/DL (ref 70–110)
MAGNESIUM SERPL-MCNC: 1.7 MG/DL (ref 1.6–2.6)
PHOSPHATE SERPL-MCNC: 4.6 MG/DL (ref 2.7–4.5)
POTASSIUM SERPL-SCNC: 3.6 MMOL/L (ref 3.5–5.1)
SODIUM SERPL-SCNC: 137 MMOL/L (ref 136–145)

## 2020-09-29 PROCEDURE — 36415 COLL VENOUS BLD VENIPUNCTURE: CPT

## 2020-09-29 PROCEDURE — 83735 ASSAY OF MAGNESIUM: CPT

## 2020-09-29 PROCEDURE — 80069 RENAL FUNCTION PANEL: CPT

## 2020-11-16 ENCOUNTER — PATIENT MESSAGE (OUTPATIENT)
Dept: ENDOCRINOLOGY | Facility: CLINIC | Age: 50
End: 2020-11-16

## 2020-11-16 DIAGNOSIS — E89.2 HYPOPARATHYROIDISM AFTER PROCEDURE: Primary | ICD-10-CM

## 2020-11-16 RX ORDER — CALCITRIOL 0.5 UG/1
0.5 CAPSULE ORAL 2 TIMES DAILY
Qty: 60 CAPSULE | Refills: 6
Start: 2020-11-16 | End: 2020-11-17 | Stop reason: SDUPTHER

## 2020-11-17 DIAGNOSIS — E89.2 HYPOPARATHYROIDISM AFTER PROCEDURE: ICD-10-CM

## 2020-11-18 RX ORDER — CALCITRIOL 0.5 UG/1
0.5 CAPSULE ORAL 2 TIMES DAILY
Qty: 60 CAPSULE | Refills: 6
Start: 2020-11-18 | End: 2020-11-20 | Stop reason: SDUPTHER

## 2020-11-20 DIAGNOSIS — E89.2 HYPOPARATHYROIDISM AFTER PROCEDURE: ICD-10-CM

## 2020-11-20 NOTE — TELEPHONE ENCOUNTER
----- Message from Marily Rico sent at 11/20/2020  2:36 PM CST -----  Pt states pleas resend her medication  calcitRIOL (ROCALTROL) 0.5 MCG [facility]    [id number] - [place], [place] - [address] [phone] (Phone)  [phone] (Fax)

## 2020-11-22 RX ORDER — CALCITRIOL 0.5 UG/1
0.5 CAPSULE ORAL 2 TIMES DAILY
Qty: 60 CAPSULE | Refills: 6
Start: 2020-11-22 | End: 2020-11-23 | Stop reason: SDUPTHER

## 2020-11-23 ENCOUNTER — PATIENT MESSAGE (OUTPATIENT)
Dept: ENDOCRINOLOGY | Facility: CLINIC | Age: 50
End: 2020-11-23

## 2020-11-23 DIAGNOSIS — E89.2 HYPOPARATHYROIDISM AFTER PROCEDURE: ICD-10-CM

## 2020-11-23 RX ORDER — CALCITRIOL 0.5 UG/1
0.5 CAPSULE ORAL 2 TIMES DAILY
Qty: 60 CAPSULE | Refills: 6 | Status: SHIPPED | OUTPATIENT
Start: 2020-11-23 | End: 2020-12-17 | Stop reason: SDUPTHER

## 2020-11-24 ENCOUNTER — PATIENT MESSAGE (OUTPATIENT)
Dept: ENDOCRINOLOGY | Facility: CLINIC | Age: 50
End: 2020-11-24

## 2020-11-27 ENCOUNTER — LAB VISIT (OUTPATIENT)
Dept: LAB | Facility: HOSPITAL | Age: 50
End: 2020-11-27
Attending: STUDENT IN AN ORGANIZED HEALTH CARE EDUCATION/TRAINING PROGRAM
Payer: OTHER GOVERNMENT

## 2020-11-27 ENCOUNTER — OFFICE VISIT (OUTPATIENT)
Dept: ENDOCRINOLOGY | Facility: CLINIC | Age: 50
End: 2020-11-27
Payer: OTHER GOVERNMENT

## 2020-11-27 DIAGNOSIS — E83.51 HYPOCALCEMIA: Chronic | ICD-10-CM

## 2020-11-27 DIAGNOSIS — C73 PAPILLARY THYROID CARCINOMA: ICD-10-CM

## 2020-11-27 DIAGNOSIS — E89.0 POSTOPERATIVE HYPOTHYROIDISM: ICD-10-CM

## 2020-11-27 LAB
ALBUMIN SERPL BCP-MCNC: 4.3 G/DL (ref 3.5–5.2)
ALP SERPL-CCNC: 107 U/L (ref 55–135)
ALT SERPL W/O P-5'-P-CCNC: 31 U/L (ref 10–44)
ANION GAP SERPL CALC-SCNC: 10 MMOL/L (ref 8–16)
AST SERPL-CCNC: 33 U/L (ref 10–40)
BILIRUB SERPL-MCNC: 0.5 MG/DL (ref 0.1–1)
BUN SERPL-MCNC: 9 MG/DL (ref 6–20)
CALCIUM SERPL-MCNC: 9.8 MG/DL (ref 8.7–10.5)
CHLORIDE SERPL-SCNC: 103 MMOL/L (ref 95–110)
CO2 SERPL-SCNC: 29 MMOL/L (ref 23–29)
CREAT SERPL-MCNC: 0.8 MG/DL (ref 0.5–1.4)
EST. GFR  (AFRICAN AMERICAN): >60 ML/MIN/1.73 M^2
EST. GFR  (NON AFRICAN AMERICAN): >60 ML/MIN/1.73 M^2
GLUCOSE SERPL-MCNC: 80 MG/DL (ref 70–110)
MAGNESIUM SERPL-MCNC: 1.9 MG/DL (ref 1.6–2.6)
PHOSPHATE SERPL-MCNC: 4.9 MG/DL (ref 2.7–4.5)
POTASSIUM SERPL-SCNC: 4 MMOL/L (ref 3.5–5.1)
PROT SERPL-MCNC: 7.6 G/DL (ref 6–8.4)
SODIUM SERPL-SCNC: 142 MMOL/L (ref 136–145)
TSH SERPL DL<=0.005 MIU/L-ACNC: 4.07 UIU/ML (ref 0.34–5.6)

## 2020-11-27 PROCEDURE — 36415 COLL VENOUS BLD VENIPUNCTURE: CPT

## 2020-11-27 PROCEDURE — 83735 ASSAY OF MAGNESIUM: CPT

## 2020-11-27 PROCEDURE — 99214 PR OFFICE/OUTPT VISIT, EST, LEVL IV, 30-39 MIN: ICD-10-PCS | Mod: 95,,, | Performed by: STUDENT IN AN ORGANIZED HEALTH CARE EDUCATION/TRAINING PROGRAM

## 2020-11-27 PROCEDURE — 84443 ASSAY THYROID STIM HORMONE: CPT

## 2020-11-27 PROCEDURE — 84100 ASSAY OF PHOSPHORUS: CPT

## 2020-11-27 PROCEDURE — 80053 COMPREHEN METABOLIC PANEL: CPT

## 2020-11-27 PROCEDURE — 99214 OFFICE O/P EST MOD 30 MIN: CPT | Mod: 95,,, | Performed by: STUDENT IN AN ORGANIZED HEALTH CARE EDUCATION/TRAINING PROGRAM

## 2020-11-27 NOTE — PROGRESS NOTES
Subjective:      Patient ID: Rosita Romano is a 50 y.o. female.    Chief Complaint:  No chief complaint on file.    Established Patient Telehealth Visit      Visit Type:  audiovisual virtual visit  Chief complaint for consultation:  Hypocalcemia  And papillary thyroid cancer follow up  The patient location: Home in [facility]  My Location during visit: [place]    Total time spent with medical discussion, chart review, management: 40 minutes    The reason for the telephone/virtual visit was:   -  Personal physical challenges, access to transportation, or health concerns for physical visit    During the visit, I was able to review their medical record.     Telemedicine enables to healthcare providers at different locations to provide safe, effective, and convenient care through the use of technology.      Patient was informed and understands the following:  -  Medical personnel cannot physically examine them.    -  Risks associated with use of telemedicine, including equipment failures.     -  Choice to received care through the use of telemedicine/virtual visits.   -  Relationship between the physician and patient, respective roles with respect to management   -  Choice to decline to receive medical services by telemedicine and may withdraw from such care at any time.    Patient consented to the use of telemedicine in their medical care. All questions regarding telemedicine were answered.          History of Present Illness  Rosita Romano is a 50 y.o. female with history of papillary thyroid cancer status post thyroidectomy, with subsequent hypothyroidism and hypoparathyroidism complicated by symptomatic hypocalcemia.  Patient is here for virtual visit follow-up        Postsurgical hypoparathyroidism  Developed following total thyroidectomy in 2018, subsequent PTH have not recovered.  Patient reports quality of life symptoms, including often on muscle spasming, tingling of her face, fatigue, and  anxiety.  Prior was on Tums, Calcitriol, and HCTZ  Unable to get NatPara due to the current recall  (2019-current)     Not taking Forteo (was on Forteo 20 mcg BID injections on 08/05/2020) - stopped due to joint pain, tingling of nose periodically  Not taking lithium     Currently on (Denies any issues except for stable chronic fatigue, intermittent joint and bone pain, tolerable anxiety/insomina):     -  Vit D 1000 IU daily   -  Tums 1000 mg 3 times a day (similar to previously scheduled)   -  Calcitriol 0.5 mcg twice a day (09/29/20 adjusted recently)   -  Potassium supplement 20 meq daily   -  Magnesium oxide 400 mg daily   -  HCTZ 12.5 mg daily      No   Yes  [x]    []  Neuro symptoms  []    [x]  Depression/anxiety  [x]    []  Mental Fog  []    [x]  Anxiety  [x]    []  Polyuria  [x]    []  Polydipsia  [x]    []  Anorexia, nausea, vomiting  []    [x]  Constipation - infrequent twice each month  [x]    []  Muscle weakness  []    [x]  Bone pain - variable intermittent   []    [x]  Fatigue - persistent      Lab Results   Component Value Date    PTH <5.0 (L) 02/19/2020    PTH <5.0 (L) 10/10/2019    PTH <5.0 (L) 08/15/2019    MOLWEOOK76NQ 48 06/08/2020    KVCNTAHD20OL 41 07/29/2019    CALCIUM 8.8 09/29/2020    CALCIUM 8.5 (L) 09/22/2020    CALCIUM 11.4 (H) 09/16/2020    PHOS 4.6 (H) 09/29/2020    PHOS 5.0 (H) 09/22/2020    PHOS 3.9 09/16/2020    ALKPHOS 60 02/19/2020    ALKPHOS 95 10/10/2019    TSH 3.631 09/16/2020    TTGIGA 7 08/15/2019    LABCALC 15.0 06/08/2020    RXJQKUA72ASH 15 (H) 06/08/2020            With regards to her hypothyroidism:   Current medication:   (name brand) Synthroid 88 mcg daily, at 5:30 AM, take Tums at 9:30AM     Takes thyroid medication properly without food first thing in the morning     TSH is above goal of 0.5 - 2  Was having symptoms of chronic fatigue,  She was advised (09/17/20) to increase Synthroid to 1.5 pill on Sunday, 1 pill daily from Monday to Saturday    Recently had preop labs  for cholecystectomy on 11/09-11/10 = TSH (0.82) - will send those labs results to us  Recently hospitalized 11/23 in [place] for tachycardia - TSH (3.35 ) & FT4 (0.93)  Symptoms described as less energy with recent dose adjustment.    Missed a few doses or two due to hospitalization    Current symptoms:     No   Yes  [x]    []   Weight gain  []    [x]   Fatigue  []    [x]   Constipation  [x]    []   Hair loss  [x]    []   Brittle nails  [x]    []   Mental fog  [x]    []   Cold intolerance  [x]    []   Memory impair  [x]    []   Muscle weakness  []    [x]   Muscle cramps - infrequent  [x]    []   Neck swelling, pain, pressure  [x]    []   Bradycardia  [x]    []   Hoarseness  [x]    []   Periorbital edema  [x]    []   Lithium or Amiodarone use  [x]    []   Chemotherapy  [x]    []   Prior neck radiation      Lab Results   Component Value Date    TSH 3.631 09/16/2020    TSH 1.513 06/08/2020    TSH 1.998 02/19/2020    FREET4 1.09 09/16/2020    FREET4 1.11 02/19/2020    FREET4 1.13 01/20/2020           With regards to her Papillary thyroid carcinoma  Referred by Dr. John Munoz.   Total thyroidectomy, [facility], [id number] multifocal PTC - 05/31/2018 (see path report in media tab)  Reviewed pathology report with Dr. Aguilar, she has low risk disease based on report. discussed i131 with pt, who does not want i131, since it is low risk disease she does not require i131 treatment.   Thyroglobulin has been undetectable since surgery.   TSH goal 2 or less. Low normal.      Thyroid US - 11/29/2019 - s/p Thyroidectomy = no abnormality       Review of Systems   Constitutional: Positive for fatigue. Negative for activity change, appetite change, chills, diaphoresis, fever and unexpected weight change.   HENT: Negative for sore throat, trouble swallowing and voice change.    Eyes: Negative for visual disturbance.   Respiratory: Negative for cough, shortness of breath and stridor.    Cardiovascular: Negative for chest pain,  palpitations and leg swelling.   Gastrointestinal: Positive for constipation. Negative for abdominal pain, diarrhea, nausea and vomiting.   Endocrine: Positive for polyuria. Negative for cold intolerance, heat intolerance, polydipsia and polyphagia.   Genitourinary: Negative for urgency.   Musculoskeletal: Negative for arthralgias, joint swelling and neck pain.   Skin: Negative for wound.   Neurological: Negative for tremors, weakness and headaches.   Psychiatric/Behavioral: Negative for confusion and sleep disturbance. The patient is nervous/anxious. The patient is not hyperactive.        Social and family history reviewed  Current medications and allergies reviewed    Objective:   There were no vitals taken for this visit.  Virtual visit therefore no Physical Exam performed  BP Readings from Last 1 Encounters:   03/06/20 122/78      Wt Readings from Last 1 Encounters:   03/06/20 1439 71.6 kg (157 lb 15.4 oz)     There is no height or weight on file to calculate BMI.    Lab Review:   No results found for: HGBA1C  No results found for: CHOL, HDL, LDLCALC, TRIG, CHOLHDL  Lab Results   Component Value Date     09/29/2020    K 3.6 09/29/2020    CL 98 09/29/2020    CO2 29 09/29/2020    GLU 91 09/29/2020    BUN 12 09/29/2020    CREATININE 0.8 09/29/2020    CALCIUM 8.8 09/29/2020    PROT 8.1 02/19/2020    ALBUMIN 4.5 09/29/2020    BILITOT 0.8 02/19/2020    ALKPHOS 60 02/19/2020    AST 22 02/19/2020    ALT 19 02/19/2020    ANIONGAP 10 09/29/2020    ESTGFRAFRICA >60.0 09/29/2020    EGFRNONAA >60.0 09/29/2020    TSH 3.631 09/16/2020       All pertinent labs reviewed    Assessment and Plan     Hypocalcemia  2/2 postoperative hypoparathyroidism  Developed following total thyroidectomy in 2018  Suspect PTH will not recover  Improvement in some of her symptoms at this time, reports improvement in quality of life   -  Prior symptoms: muscle spasming, tingling of her face, fatigue, and anxiety.    -  Unable to get NatPara  due to the current recall  (2019-current)   -  Patient stopped taking Forteo (was on Forteo 20 mcg BID injections on 08/05/2020) - stopped due to joint pain, numbness tingling    -  Last 24 hour Urine Ca elevated 350. Unclear if she was back on HCTZ at the time    Currently on:   -  Vit D 1000 IU daily   -  Tums 1000 mg 3 times a day   -  Calcitriol 0.5 mcg twice a day   -  Potassium supplement 20 meq daily   -  Magnesium oxide 400 mg daily    -  Continue HCTZ 12.5 mg daily - adjust pending urine ca   -  Check phos, calcium, mag   -  Check 24 hour urine collection    -  Consider PTH in 6 months    Papillary thyroid carcinoma  S/p total thyroidectomy, [facility], [id number] multifocal PTC - 05/31/2018   Reviewed pathology report -  has low risk disease based on report.   No h/o i131 with pt, and she does not want i131, since it is low risk disease she does not require i131 treatment.     Thyroglobulin has been Detectable since surgery - follow up annually   TSH goal 2 or less. Low normal.      6/8/2020   Thyroglobulin Antibody Screen <1.8   Thyroglobulin, Tumor Marker 0.2 (H)        Thyroid US - 11/29/2019 - s/p Thyroidectomy = no abnormality     Reviewed pathology, she has low risk disease based on report.  Treatment with i131 discussed in the past with pt, who does not want i131 and since it is low risk disease she does not require i131 treatment    - Recheck Thyroglobulin and ab in 6 months with thyroid US at that time  - TSH goal 2 or less     Postoperative hypothyroidism  Biochemically euthyroid.    Recent dose increase half pill (09/17/20)   On Synthroid to 1.5 pill on Sunday, 1 pill daily from Monday to Saturday    Goal = 0.5-2 (h/o PTC)    Plan  Continue levothyroxine 88 mcg daily (1.5 pill on Sunday)  TSH today      RTC in 6 months in person not virtual to assess thyroid      Heladio Vargas DO  Endocrinology Fellow  [facility], 6th Floor  [address] [facility], [place],  [address]    Office: [phone]  Fax: [phone]       Disclaimer: This note has been generated using voice-recognition software. There may be typographical errors that have been missed during proof-reading.    The above history labs imaging impression and plan were discussed with attending physician who is in agreement and also took part in this patient's care.  I personally reviewed all of the patients available medications, labs, imaging, vitals, allergies, medical history.

## 2020-11-27 NOTE — PATIENT INSTRUCTIONS
Here are the instructions on how to complete the 24 hour urine collection:    1) On the day you begin the collection, you will urinate first thing in the morning into the toilet (the first urine is discarded). Write the date and time on the collection label as the start time.  2) Collect all subsequent urine for the next 24 hrs into a urine hat and pour into the 24 hr urine container.  3) At the 24 hour maxine the next day, your final urination will be collected into the container. It should be the first urine of the day and fall at the same time as the start time from the previous morning.   4) After you complete the collection, bring the sample to any [facility] lab for analysis. Results should return in ~2-5 business days.    The urine jug can be stored at room temperature in a cool/dry place for this collection. It does not need to be refrigerated.

## 2020-11-27 NOTE — PROGRESS NOTES
I have reviewed and concur with Dr. Vargas's history, physical, assessment, and plan.  I have personally interviewed and examined the patient.  See below addendum for my evaluation and additional findings.    Regarding history of thyroid cancer, she has had a very low thyroglobulin with undetectable thyroglobulin antibodies so will plan to repeat 1 year from last test in addition to neck ultrasound at that time.  Given low risk of recurrence goal TSH is in the lower half of the normal range.  Will check TSH now and adjust levothyroxine dose as needed.  She currently has no symptoms of hypocalcemia and is maintained on Tums, calcitriol, and hydrochlorothiazide.  Will plan to repeat her 24 hr urine calcium to see if we need to increase dose of hydrochlorothiazide.  Will check renal panel to see if calcium or calcitriol needs to be adjusted.    Uziel Zamora MD

## 2020-12-04 ENCOUNTER — TELEPHONE (OUTPATIENT)
Dept: ENDOCRINOLOGY | Facility: HOSPITAL | Age: 50
End: 2020-12-04

## 2020-12-04 NOTE — TELEPHONE ENCOUNTER
Contacted patient on 12/04/2020 at 11:00 a.m. Spoke directly with the patient regarding increasing dose of levothyroxine 88 mcg daily with 2 tablets on Saturday and Sunday due to elevated TSH (goal TSH less than 2 without suppression in setting of low risk PTC).  She was instructed to notify me if she is running low on tablets and needs refills. Patient was agreeable with the plan, all labs were reviewed in detail, and all questions were answered.

## 2020-12-15 ENCOUNTER — LAB VISIT (OUTPATIENT)
Dept: LAB | Facility: HOSPITAL | Age: 50
End: 2020-12-15
Attending: STUDENT IN AN ORGANIZED HEALTH CARE EDUCATION/TRAINING PROGRAM
Payer: OTHER GOVERNMENT

## 2020-12-15 DIAGNOSIS — E83.51 HYPOCALCEMIA: Chronic | ICD-10-CM

## 2020-12-15 PROCEDURE — 82570 ASSAY OF URINE CREATININE: CPT

## 2020-12-15 PROCEDURE — 82340 ASSAY OF CALCIUM IN URINE: CPT

## 2020-12-16 LAB
CALCIUM 24H UR-MRATE: 24 MG/HR (ref 4–12)
CALCIUM UR-MCNC: 23.2 MG/DL (ref 0–15)
CALCIUM URINE (MG/SPEC): 574 MG/SPEC
CREAT 24H UR-MRATE: 59.7 MG/HR (ref 40–75)
CREAT UR-MCNC: 57.9 MG/DL (ref 15–325)
CREATININE, URINE (MG/SPEC): 1433 MG/SPEC
URINE COLLECTION DURATION: 24 HR
URINE COLLECTION DURATION: 24 HR
URINE VOLUME: 2475 ML
URINE VOLUME: 2475 ML

## 2020-12-17 ENCOUNTER — TELEPHONE (OUTPATIENT)
Dept: ENDOCRINOLOGY | Facility: CLINIC | Age: 50
End: 2020-12-17

## 2020-12-17 DIAGNOSIS — E89.2 HYPOPARATHYROIDISM AFTER PROCEDURE: ICD-10-CM

## 2020-12-17 DIAGNOSIS — E89.0 POSTOPERATIVE HYPOTHYROIDISM: ICD-10-CM

## 2020-12-17 RX ORDER — LEVOTHYROXINE SODIUM 88 UG/1
88 TABLET ORAL
Qty: 40 TABLET | Refills: 11 | Status: SHIPPED | OUTPATIENT
Start: 2020-12-17 | End: 2020-12-17 | Stop reason: SDUPTHER

## 2020-12-17 RX ORDER — CALCITRIOL 0.25 UG/1
0.25 CAPSULE ORAL 2 TIMES DAILY
Qty: 60 CAPSULE | Refills: 6 | Status: SHIPPED | OUTPATIENT
Start: 2020-12-17 | End: 2020-12-17 | Stop reason: SDUPTHER

## 2020-12-17 RX ORDER — LEVOTHYROXINE SODIUM 88 UG/1
88 TABLET ORAL
Qty: 40 TABLET | Refills: 11 | Status: SHIPPED | OUTPATIENT
Start: 2020-12-17 | End: 2021-01-13 | Stop reason: SDUPTHER

## 2020-12-17 RX ORDER — HYDROCHLOROTHIAZIDE 25 MG/1
25 TABLET ORAL DAILY
Qty: 30 TABLET | Refills: 11 | Status: SHIPPED | OUTPATIENT
Start: 2020-12-17 | End: 2020-12-17 | Stop reason: SDUPTHER

## 2020-12-17 RX ORDER — HYDROCHLOROTHIAZIDE 25 MG/1
25 TABLET ORAL DAILY
Qty: 30 TABLET | Refills: 11 | Status: SHIPPED | OUTPATIENT
Start: 2020-12-17 | End: 2023-05-22

## 2020-12-17 RX ORDER — CALCITRIOL 0.25 UG/1
0.25 CAPSULE ORAL 2 TIMES DAILY
Qty: 60 CAPSULE | Refills: 6 | Status: SHIPPED | OUTPATIENT
Start: 2020-12-17 | End: 2022-05-09 | Stop reason: SDUPTHER

## 2020-12-17 NOTE — TELEPHONE ENCOUNTER
Dx: Hypocalcemia 2/2 postoperative hypoparathyroidism     24 hour urine collection - elevated urine calcium and adequate sample    Serum Ca and TSH above goal    Goal serum Ca: 8-8.5    Goal TSH= 0.5-2 (h/o PTC)     Currently on:              -  Vit D 1000 IU daily              -  Tums 1000 mg 3 times a day              -  Calcitriol 0.5 mcg twice a day              -  Potassium supplement 20 meq daily              -  Magnesium oxide 400 mg daily               -  Continue HCTZ 12.5 mg daily                            Medication Changes, Rx Sent:   -  Increase HCTZ 25 mg daily    -  Pt to check bp daily and notify me for orthostatic symptoms or low bp   -  Decrease Calcitriol 0.25 mcg twice a day   -  Increase Levothyroxine 88 mcg to 1 tablet M-F and 1.5 tablet Sat&Sun    Reviewed labs with patient, discussed plan as above, all questions were answered.

## 2020-12-29 ENCOUNTER — PATIENT MESSAGE (OUTPATIENT)
Dept: ENDOCRINOLOGY | Facility: CLINIC | Age: 50
End: 2020-12-29

## 2021-01-05 ENCOUNTER — TELEPHONE (OUTPATIENT)
Dept: ENDOCRINOLOGY | Facility: CLINIC | Age: 51
End: 2021-01-05

## 2021-01-05 DIAGNOSIS — E89.0 POSTOPERATIVE HYPOTHYROIDISM: Primary | ICD-10-CM

## 2021-01-05 DIAGNOSIS — E83.51 HYPOCALCEMIA: ICD-10-CM

## 2021-01-09 ENCOUNTER — LAB VISIT (OUTPATIENT)
Dept: LAB | Facility: HOSPITAL | Age: 51
End: 2021-01-09
Attending: STUDENT IN AN ORGANIZED HEALTH CARE EDUCATION/TRAINING PROGRAM
Payer: OTHER GOVERNMENT

## 2021-01-09 DIAGNOSIS — E89.0 POSTOPERATIVE HYPOTHYROIDISM: ICD-10-CM

## 2021-01-09 DIAGNOSIS — E83.51 HYPOCALCEMIA: ICD-10-CM

## 2021-01-09 LAB
ALBUMIN SERPL BCP-MCNC: 4.2 G/DL (ref 3.5–5.2)
ANION GAP SERPL CALC-SCNC: 10 MMOL/L (ref 8–16)
BUN SERPL-MCNC: 19 MG/DL (ref 6–20)
CALCIUM SERPL-MCNC: 9.2 MG/DL (ref 8.7–10.5)
CHLORIDE SERPL-SCNC: 101 MMOL/L (ref 95–110)
CO2 SERPL-SCNC: 29 MMOL/L (ref 23–29)
CREAT SERPL-MCNC: 0.9 MG/DL (ref 0.5–1.4)
EST. GFR  (AFRICAN AMERICAN): >60 ML/MIN/1.73 M^2
EST. GFR  (NON AFRICAN AMERICAN): >60 ML/MIN/1.73 M^2
GLUCOSE SERPL-MCNC: 61 MG/DL (ref 70–110)
PHOSPHATE SERPL-MCNC: 5.2 MG/DL (ref 2.7–4.5)
POTASSIUM SERPL-SCNC: 4.1 MMOL/L (ref 3.5–5.1)
SODIUM SERPL-SCNC: 140 MMOL/L (ref 136–145)
T4 FREE SERPL-MCNC: 1.32 NG/DL (ref 0.71–1.51)
TSH SERPL DL<=0.005 MIU/L-ACNC: 0.12 UIU/ML (ref 0.34–5.6)

## 2021-01-09 PROCEDURE — 84443 ASSAY THYROID STIM HORMONE: CPT

## 2021-01-09 PROCEDURE — 36415 COLL VENOUS BLD VENIPUNCTURE: CPT

## 2021-01-09 PROCEDURE — 84439 ASSAY OF FREE THYROXINE: CPT

## 2021-01-09 PROCEDURE — 80069 RENAL FUNCTION PANEL: CPT

## 2021-01-13 DIAGNOSIS — E89.0 POSTOPERATIVE HYPOTHYROIDISM: ICD-10-CM

## 2021-01-13 RX ORDER — LEVOTHYROXINE SODIUM 88 UG/1
88 TABLET ORAL
Qty: 40 TABLET | Refills: 11 | Status: SHIPPED | OUTPATIENT
Start: 2021-01-13 | End: 2021-01-13 | Stop reason: SDUPTHER

## 2021-01-13 RX ORDER — LEVOTHYROXINE SODIUM 88 UG/1
88 TABLET ORAL
Qty: 40 TABLET | Refills: 11 | Status: SHIPPED | OUTPATIENT
Start: 2021-01-13 | End: 2021-01-13

## 2021-01-14 RX ORDER — LEVOTHYROXINE SODIUM 88 UG/1
88 TABLET ORAL
Qty: 40 TABLET | Refills: 11 | Status: SHIPPED | OUTPATIENT
Start: 2021-01-14 | End: 2021-07-07 | Stop reason: SDUPTHER

## 2021-03-15 ENCOUNTER — PATIENT MESSAGE (OUTPATIENT)
Dept: ENDOCRINOLOGY | Facility: CLINIC | Age: 51
End: 2021-03-15

## 2021-03-15 DIAGNOSIS — E89.0 POSTOPERATIVE HYPOTHYROIDISM: Primary | ICD-10-CM

## 2021-03-23 ENCOUNTER — PATIENT MESSAGE (OUTPATIENT)
Dept: ENDOCRINOLOGY | Facility: CLINIC | Age: 51
End: 2021-03-23

## 2021-03-24 ENCOUNTER — PATIENT MESSAGE (OUTPATIENT)
Dept: ENDOCRINOLOGY | Facility: CLINIC | Age: 51
End: 2021-03-24

## 2021-03-24 DIAGNOSIS — E89.0 POSTOPERATIVE HYPOTHYROIDISM: Primary | ICD-10-CM

## 2021-03-24 DIAGNOSIS — E89.2 HYPOPARATHYROIDISM AFTER PROCEDURE: ICD-10-CM

## 2021-03-27 ENCOUNTER — LAB VISIT (OUTPATIENT)
Dept: LAB | Facility: HOSPITAL | Age: 51
End: 2021-03-27
Attending: STUDENT IN AN ORGANIZED HEALTH CARE EDUCATION/TRAINING PROGRAM
Payer: OTHER GOVERNMENT

## 2021-03-27 DIAGNOSIS — E89.0 POSTOPERATIVE HYPOTHYROIDISM: ICD-10-CM

## 2021-03-27 DIAGNOSIS — E89.2 HYPOPARATHYROIDISM AFTER PROCEDURE: ICD-10-CM

## 2021-03-27 LAB
ALBUMIN SERPL BCP-MCNC: 4.4 G/DL (ref 3.5–5.2)
ANION GAP SERPL CALC-SCNC: 12 MMOL/L (ref 8–16)
BUN SERPL-MCNC: 14 MG/DL (ref 6–20)
CALCIUM SERPL-MCNC: 9.2 MG/DL (ref 8.7–10.5)
CHLORIDE SERPL-SCNC: 100 MMOL/L (ref 95–110)
CO2 SERPL-SCNC: 30 MMOL/L (ref 23–29)
CREAT SERPL-MCNC: 0.9 MG/DL (ref 0.5–1.4)
EST. GFR  (AFRICAN AMERICAN): >60 ML/MIN/1.73 M^2
EST. GFR  (NON AFRICAN AMERICAN): >60 ML/MIN/1.73 M^2
GLUCOSE SERPL-MCNC: 73 MG/DL (ref 70–110)
PHOSPHATE SERPL-MCNC: 5 MG/DL (ref 2.7–4.5)
POTASSIUM SERPL-SCNC: 4.1 MMOL/L (ref 3.5–5.1)
SODIUM SERPL-SCNC: 142 MMOL/L (ref 136–145)
T4 FREE SERPL-MCNC: 1.17 NG/DL (ref 0.71–1.51)
TSH SERPL DL<=0.005 MIU/L-ACNC: 0.24 UIU/ML (ref 0.34–5.6)

## 2021-03-27 PROCEDURE — 84443 ASSAY THYROID STIM HORMONE: CPT | Performed by: STUDENT IN AN ORGANIZED HEALTH CARE EDUCATION/TRAINING PROGRAM

## 2021-03-27 PROCEDURE — 84439 ASSAY OF FREE THYROXINE: CPT | Performed by: STUDENT IN AN ORGANIZED HEALTH CARE EDUCATION/TRAINING PROGRAM

## 2021-03-27 PROCEDURE — 36415 COLL VENOUS BLD VENIPUNCTURE: CPT | Performed by: STUDENT IN AN ORGANIZED HEALTH CARE EDUCATION/TRAINING PROGRAM

## 2021-03-27 PROCEDURE — 80069 RENAL FUNCTION PANEL: CPT | Performed by: STUDENT IN AN ORGANIZED HEALTH CARE EDUCATION/TRAINING PROGRAM

## 2021-03-29 ENCOUNTER — TELEPHONE (OUTPATIENT)
Dept: ENDOCRINOLOGY | Facility: HOSPITAL | Age: 51
End: 2021-03-29

## 2021-03-29 ENCOUNTER — PATIENT MESSAGE (OUTPATIENT)
Dept: ENDOCRINOLOGY | Facility: CLINIC | Age: 51
End: 2021-03-29

## 2021-03-31 ENCOUNTER — TELEPHONE (OUTPATIENT)
Dept: ENDOCRINOLOGY | Facility: CLINIC | Age: 51
End: 2021-03-31

## 2021-04-06 ENCOUNTER — PATIENT MESSAGE (OUTPATIENT)
Dept: ENDOCRINOLOGY | Facility: CLINIC | Age: 51
End: 2021-04-06

## 2021-04-14 ENCOUNTER — TELEPHONE (OUTPATIENT)
Dept: ENDOCRINOLOGY | Facility: CLINIC | Age: 51
End: 2021-04-14

## 2021-04-20 ENCOUNTER — PATIENT MESSAGE (OUTPATIENT)
Dept: ENDOCRINOLOGY | Facility: CLINIC | Age: 51
End: 2021-04-20

## 2021-04-20 ENCOUNTER — OFFICE VISIT (OUTPATIENT)
Dept: ENDOCRINOLOGY | Facility: CLINIC | Age: 51
End: 2021-04-20
Payer: OTHER GOVERNMENT

## 2021-04-20 VITALS
BODY MASS INDEX: 26.59 KG/M2 | SYSTOLIC BLOOD PRESSURE: 124 MMHG | HEIGHT: 66 IN | DIASTOLIC BLOOD PRESSURE: 80 MMHG | WEIGHT: 165.44 LBS

## 2021-04-20 DIAGNOSIS — C73 PAPILLARY THYROID CARCINOMA: ICD-10-CM

## 2021-04-20 DIAGNOSIS — E89.0 POSTOPERATIVE HYPOTHYROIDISM: ICD-10-CM

## 2021-04-20 DIAGNOSIS — E89.2 HYPOPARATHYROIDISM AFTER PROCEDURE: Primary | ICD-10-CM

## 2021-04-20 PROCEDURE — 99999 PR PBB SHADOW E&M-EST. PATIENT-LVL IV: ICD-10-PCS | Mod: PBBFAC,,, | Performed by: STUDENT IN AN ORGANIZED HEALTH CARE EDUCATION/TRAINING PROGRAM

## 2021-04-20 PROCEDURE — 99214 PR OFFICE/OUTPT VISIT, EST, LEVL IV, 30-39 MIN: ICD-10-PCS | Mod: S$PBB,,, | Performed by: STUDENT IN AN ORGANIZED HEALTH CARE EDUCATION/TRAINING PROGRAM

## 2021-04-20 PROCEDURE — 99214 OFFICE O/P EST MOD 30 MIN: CPT | Mod: PBBFAC | Performed by: STUDENT IN AN ORGANIZED HEALTH CARE EDUCATION/TRAINING PROGRAM

## 2021-04-20 PROCEDURE — 99999 PR PBB SHADOW E&M-EST. PATIENT-LVL IV: CPT | Mod: PBBFAC,,, | Performed by: STUDENT IN AN ORGANIZED HEALTH CARE EDUCATION/TRAINING PROGRAM

## 2021-04-20 PROCEDURE — 99214 OFFICE O/P EST MOD 30 MIN: CPT | Mod: S$PBB,,, | Performed by: STUDENT IN AN ORGANIZED HEALTH CARE EDUCATION/TRAINING PROGRAM

## 2021-04-20 RX ORDER — CLONAZEPAM 0.5 MG/1
TABLET ORAL
COMMUNITY
Start: 2021-02-17

## 2021-04-20 RX ORDER — AMITRIPTYLINE HYDROCHLORIDE 25 MG/1
25 TABLET, FILM COATED ORAL NIGHTLY
COMMUNITY
Start: 2021-02-17

## 2021-04-20 RX ORDER — METOPROLOL TARTRATE 25 MG/1
12.5 TABLET, FILM COATED ORAL 2 TIMES DAILY
COMMUNITY
Start: 2021-04-13

## 2021-05-26 ENCOUNTER — PATIENT MESSAGE (OUTPATIENT)
Dept: ENDOCRINOLOGY | Facility: CLINIC | Age: 51
End: 2021-05-26

## 2021-06-04 ENCOUNTER — TELEPHONE (OUTPATIENT)
Dept: ENDOCRINOLOGY | Facility: CLINIC | Age: 51
End: 2021-06-04

## 2021-06-04 ENCOUNTER — PATIENT MESSAGE (OUTPATIENT)
Dept: ENDOCRINOLOGY | Facility: CLINIC | Age: 51
End: 2021-06-04

## 2021-06-04 DIAGNOSIS — E89.0 POSTOPERATIVE HYPOTHYROIDISM: ICD-10-CM

## 2021-06-04 DIAGNOSIS — E89.2 HYPOPARATHYROIDISM AFTER PROCEDURE: Primary | ICD-10-CM

## 2021-06-05 ENCOUNTER — LAB VISIT (OUTPATIENT)
Dept: LAB | Facility: HOSPITAL | Age: 51
End: 2021-06-05
Attending: STUDENT IN AN ORGANIZED HEALTH CARE EDUCATION/TRAINING PROGRAM
Payer: OTHER GOVERNMENT

## 2021-06-05 DIAGNOSIS — E89.0 POSTOPERATIVE HYPOTHYROIDISM: ICD-10-CM

## 2021-06-05 DIAGNOSIS — E89.2 HYPOPARATHYROIDISM AFTER PROCEDURE: ICD-10-CM

## 2021-06-05 LAB
ALBUMIN SERPL BCP-MCNC: 4.3 G/DL (ref 3.5–5.2)
ANION GAP SERPL CALC-SCNC: 12 MMOL/L (ref 8–16)
BUN SERPL-MCNC: 10 MG/DL (ref 6–20)
CALCIUM SERPL-MCNC: 9.7 MG/DL (ref 8.7–10.5)
CHLORIDE SERPL-SCNC: 100 MMOL/L (ref 95–110)
CO2 SERPL-SCNC: 29 MMOL/L (ref 23–29)
CREAT SERPL-MCNC: 1 MG/DL (ref 0.5–1.4)
EST. GFR  (AFRICAN AMERICAN): >60 ML/MIN/1.73 M^2
EST. GFR  (NON AFRICAN AMERICAN): >60 ML/MIN/1.73 M^2
GLUCOSE SERPL-MCNC: 83 MG/DL (ref 70–110)
PHOSPHATE SERPL-MCNC: 5.3 MG/DL (ref 2.7–4.5)
POTASSIUM SERPL-SCNC: 4 MMOL/L (ref 3.5–5.1)
SODIUM SERPL-SCNC: 141 MMOL/L (ref 136–145)
T4 FREE SERPL-MCNC: 1.24 NG/DL (ref 0.71–1.51)
TSH SERPL DL<=0.005 MIU/L-ACNC: 0.93 UIU/ML (ref 0.4–4)

## 2021-06-05 PROCEDURE — 80069 RENAL FUNCTION PANEL: CPT | Performed by: STUDENT IN AN ORGANIZED HEALTH CARE EDUCATION/TRAINING PROGRAM

## 2021-06-05 PROCEDURE — 84439 ASSAY OF FREE THYROXINE: CPT | Performed by: STUDENT IN AN ORGANIZED HEALTH CARE EDUCATION/TRAINING PROGRAM

## 2021-06-05 PROCEDURE — 36415 COLL VENOUS BLD VENIPUNCTURE: CPT | Performed by: STUDENT IN AN ORGANIZED HEALTH CARE EDUCATION/TRAINING PROGRAM

## 2021-06-05 PROCEDURE — 84443 ASSAY THYROID STIM HORMONE: CPT | Performed by: STUDENT IN AN ORGANIZED HEALTH CARE EDUCATION/TRAINING PROGRAM

## 2021-06-08 ENCOUNTER — TELEPHONE (OUTPATIENT)
Dept: ENDOCRINOLOGY | Facility: CLINIC | Age: 51
End: 2021-06-08

## 2021-06-21 ENCOUNTER — PATIENT MESSAGE (OUTPATIENT)
Dept: ENDOCRINOLOGY | Facility: CLINIC | Age: 51
End: 2021-06-21

## 2021-06-23 ENCOUNTER — PATIENT MESSAGE (OUTPATIENT)
Dept: ENDOCRINOLOGY | Facility: CLINIC | Age: 51
End: 2021-06-23

## 2021-06-28 ENCOUNTER — TELEPHONE (OUTPATIENT)
Dept: ENDOCRINOLOGY | Facility: CLINIC | Age: 51
End: 2021-06-28

## 2021-06-30 ENCOUNTER — TELEPHONE (OUTPATIENT)
Dept: ENDOCRINOLOGY | Facility: CLINIC | Age: 51
End: 2021-06-30

## 2021-07-01 ENCOUNTER — TELEPHONE (OUTPATIENT)
Dept: ENDOCRINOLOGY | Facility: CLINIC | Age: 51
End: 2021-07-01

## 2021-07-02 ENCOUNTER — TELEPHONE (OUTPATIENT)
Dept: ENDOCRINOLOGY | Facility: CLINIC | Age: 51
End: 2021-07-02

## 2021-07-04 NOTE — DISCHARGE INSTRUCTIONS
Continue taking Tums and drink plenty of fluids.  Return to the emergency department if you develop new weakness, chest pain or confusion.    Our goal in the emergency department is to always give you outstanding care and exceptional service. You may receive a survey by mail or e-mail in the next week regarding your experience in our ED. We would greatly appreciate your completing and returning the survey. Your feedback provides us with a way to recognize our staff who give very good care and it helps us learn how to improve when your experience was below our aspiration of excellence.     
No

## 2021-07-06 ENCOUNTER — PATIENT MESSAGE (OUTPATIENT)
Dept: ENDOCRINOLOGY | Facility: CLINIC | Age: 51
End: 2021-07-06

## 2021-07-07 DIAGNOSIS — E89.0 POSTOPERATIVE HYPOTHYROIDISM: ICD-10-CM

## 2021-07-07 RX ORDER — LEVOTHYROXINE SODIUM 88 UG/1
88 TABLET ORAL
Qty: 40 TABLET | Refills: 11 | Status: SHIPPED | OUTPATIENT
Start: 2021-07-07 | End: 2022-07-07

## 2021-07-16 ENCOUNTER — TELEPHONE (OUTPATIENT)
Dept: ENDOCRINOLOGY | Facility: CLINIC | Age: 51
End: 2021-07-16

## 2021-07-20 ENCOUNTER — PATIENT MESSAGE (OUTPATIENT)
Dept: ENDOCRINOLOGY | Facility: CLINIC | Age: 51
End: 2021-07-20

## 2021-08-06 ENCOUNTER — TELEPHONE (OUTPATIENT)
Dept: ENDOCRINOLOGY | Facility: CLINIC | Age: 51
End: 2021-08-06

## 2021-12-29 ENCOUNTER — PATIENT MESSAGE (OUTPATIENT)
Dept: ENDOCRINOLOGY | Facility: CLINIC | Age: 51
End: 2021-12-29
Payer: OTHER GOVERNMENT

## 2022-02-16 ENCOUNTER — PATIENT MESSAGE (OUTPATIENT)
Dept: ENDOCRINOLOGY | Facility: CLINIC | Age: 52
End: 2022-02-16
Payer: OTHER GOVERNMENT

## 2022-03-11 ENCOUNTER — PATIENT MESSAGE (OUTPATIENT)
Dept: ENDOCRINOLOGY | Facility: CLINIC | Age: 52
End: 2022-03-11
Payer: OTHER GOVERNMENT

## 2022-05-06 NOTE — PROGRESS NOTES
Subjective:      Patient ID: Rosita Romano is a 52 y.o. female.    Chief Complaint:   history of papillary thyroid cancer status post thyroidectomy, with subsequent hypothyroidism and hypoparathyroidism     History of Present Illness    oRsita Romano is a 52 y.o. female with history of papillary thyroid cancer status post thyroidectomy, with subsequent hypothyroidism and hypoparathyroidism complicated by symptomatic hypocalcemia.      Patient is here for virtual visit follow-up       Last seen 4/2021:   -  Working on insurance to transition into Long term disability  -  Moved to [place], was evaluated by endocrinology there, Calcium levels have been mostly at goal if not elevated since last year. Pt reports dissatisfied and inappropriate care from [place] endocrinologist, was seeing PT to help with joint and muscle pain, and Zoloft and klonipin for anxiety.      -  Endorses chronic fatigue, intermittent joint and bone pain, tolerable anxiety/insomina, occasional palpitations intermittent paresthesias, constipation, memory impairment with severe anxiety, chronic joint pains muscle cramps and spasms.  -  Endorses limited functional capacity often times becoming extremely exhausted after cleaning rooms and fatigue limiting routine housework.   -  Attempting treadmill exercise twice weekly walking.  Fatigue improved after 2-3 hours of rest.  -  Sleeping and insomnia improving, however fatigue and energy occasionally lead to hypersomnolence.  -  Overall, energy remains poor and interferes with her quality of life preventing her from performing daily tasks adequately         Postsurgical hypoparathyroidism,   Developed following total thyroidectomy in 2018, subsequent PTH have not recovered.    Reports poor quality of life with myriad of symptoms, including often on muscle spasming, paresthesia, tingling of her face, extreme fatigue, and anxiety.     Unable to get NatPara due to the current recall  (2019-current)    Did  "not tolerate Forteo (was on Forteo 20 mcg BID injections on 08/05/2020) - stopped due to joint/bone pain       Current medication:     -  Calcium Carbonate (tums) 1000 mg (400 elemental) a.m., 500 mg lunch, 1000 mg q.h.s.      -  Calcitriol 0.25 mcg b.i.d. (adjusted 12/17)               -  HCTZ 25 mg at bedtime (adjusted 12/17) - tolerating at bedtime, had low bp with higher dose               -  Vit D 1000 IU daily      Current Symptoms:   -  Denies paresthesias, muscle cramps, muscle spasm, mental fog, polyuria, polydipsia, nausea, abdominal discomfort, loss of appetite   -  Endorses muscle weakness, persistent moderate-severe fatigue, intermittent bone pain, constipation, anxiety             Regarding postoperative hypothyroidism:   S/p TT for PTC    Current medication:    Levothyroxine 88 mcg daily    (name brand) Synthroid 88 mcg daily, at 5:30 AM, take Tums at 9:30AM,     Takes thyroid medication without food first thing in the morning, however takes with betablocker chronically   Has been mostly clinically euthyroid and biochemically at goal       TSH goal of 0.5 - 2      Current symptoms:     -  a denies weight gain, hair loss, brittle nails, mental fog, cold intolerance, compressive symptoms, left femur amiodarone use, edema, hoarseness   -  Endorses fatigue, constipation, memory impaired        Regarding Papillary thyroid carcinoma   Referred by Dr. John Munoz.    Total thyroidectomy, [facility], [id number] multifocal PTC - 05/31/2018 (see path report in media tab)   Reviewed pathology report with Dr. Aguilar, she has low risk disease based on report.    Pt deferred HUNG or remnant ablation, since low risk disease     Thyroglobulin has been undetectable/low and unchanged since surgery.     TSH goal 2 or less. Low normal.       Thyroid US - 11/29/2019 - s/p Thyroidectomy = no abnormality          ROS as above    Objective:   /70   Ht 5' 6" (1.676 m)   Wt 81.9 kg (180 lb 8.9 oz)   BMI 29.14 kg/m² "   Physical Exam  Constitutional:       General: She is not in acute distress.     Appearance: Normal appearance.   Pulmonary:      Effort: No respiratory distress.   Musculoskeletal:      Right lower leg: No edema.      Left lower leg: No edema.   Neurological:      Motor: No weakness.       BP Readings from Last 1 Encounters:   05/09/22 122/70      Wt Readings from Last 1 Encounters:   05/09/22 0954 81.9 kg (180 lb 8.9 oz)     Body mass index is 29.14 kg/m².    Lab Review:   No results found for: HGBA1C  No results found for: CHOL, HDL, LDLCALC, TRIG, CHOLHDL  Lab Results   Component Value Date     05/09/2022    K 3.7 05/09/2022     05/09/2022    CO2 27 05/09/2022     (H) 05/09/2022    BUN 10 05/09/2022    CREATININE 1.0 05/09/2022    CALCIUM 9.3 05/09/2022    PROT 7.6 11/27/2020    ALBUMIN 4.1 05/09/2022    BILITOT 0.5 11/27/2020    ALKPHOS 107 11/27/2020    AST 33 11/27/2020    ALT 31 11/27/2020    ANIONGAP 12 05/09/2022    ESTGFRAFRICA >60.0 05/09/2022    EGFRNONAA >60.0 05/09/2022    TSH 0.658 05/09/2022       All pertinent labs reviewed    Assessment and Plan     Hypoparathyroidism after procedure  2/2 postoperative hypoparathyroidism following total thyroidectomy in 2018  --Suspect PTH will not recover  --Symptomatic despite corrected calcium, suspect symptoms likely due to alternative etiology  --Endorses poor quality of life but tolerable, unable to work due to moderate to sever fatigue   --Unable to get NatPara, stopped Forteo due to s/e     --Corrected calcium has been WNL however phos slightly elevated  --Prior 24 hour Urine Ca elevated, improved with HCTZ     --Currently taking:   -  TUMS 1000 mg (400 elemental) a.m., 500 mg lunch, 1000 mg q.h.s.     -  Calcitriol 0.25 mcg b.i.d.   -  HCTZ 25 mg at bedtime    -  Vit D 1000 IU daily      Plan:   -  Check renal panel, ionized calcium    - If phos elevated decrease calcitriol    - Adjust tums accordingly    - Consider calcium citrate to  help with constipation              -  Check 24 hour urine collection     - if elevated consider adding HCTZ 12.5 mg and/or increase HCTZ 25 mg bedtime   -  Baseline DXA w/ radius ordered, never obtained                               Papillary thyroid carcinoma  -  S/p total thyroidectomy, [facility], [id number] multifocal PTC - 05/31/2018, with low risk disease on path report  -  No HUNG  -  Very ow level Thyroglobulinemia since surgery , following annually  -  TSH goal 2 or less. Low normal.   -  Thyroid US - 11/27/2019 - s/p Thyroidectomy = no abnormality      Plan:              -  Recheck Thyroglobulin and abs - annually              -  Consider Thyroid US - annually pending Tg              -  TSH to goal 2 or less while avoid overtreatment/Sx        Postoperative hypothyroidism  --S/p total thyroidectomy  --Biochemically and clinically euthyroid.  --TSH at goal     Current medication:               -  Levothyroxine 88 mcg daily   (name brand) Synthroid 88 mcg daily, at 5:30 AM, take Tums at 9:30AM   Chronically takes with betablocker, instructed on proper administration      Goal = 0.5-2 (h/o PTC)      Plan:              -  Continue levothyroxine 88 mcg daily               -  TSH annually or sooner if adjustments         RTC in 1 year with labs before if necessary     Heladio Vargas DO  Endocrinology Fellow  [facility], 6th Floor  [address] [place], [place], [address]    Office: [phone]  Fax: [phone]       Disclaimer: This note has been generated using voice-recognition software. There may be typographical errors that have been missed during proof-reading.    The above history labs imaging impression and plan were discussed with attending physician who is in agreement and also took part in this patient's care.  I personally reviewed all of the patients available medications, labs, imaging, vitals, allergies, medical history.        I, Cheyenne oRdgers MD,  have personally  taken the history and examined the patient and agree with the resident's note as stated above.

## 2022-05-09 ENCOUNTER — LAB VISIT (OUTPATIENT)
Dept: LAB | Facility: HOSPITAL | Age: 52
End: 2022-05-09
Payer: OTHER GOVERNMENT

## 2022-05-09 ENCOUNTER — OFFICE VISIT (OUTPATIENT)
Dept: ENDOCRINOLOGY | Facility: CLINIC | Age: 52
End: 2022-05-09
Payer: OTHER GOVERNMENT

## 2022-05-09 VITALS
DIASTOLIC BLOOD PRESSURE: 70 MMHG | HEIGHT: 66 IN | SYSTOLIC BLOOD PRESSURE: 122 MMHG | WEIGHT: 180.56 LBS | BODY MASS INDEX: 29.02 KG/M2

## 2022-05-09 DIAGNOSIS — E89.2 HYPOPARATHYROIDISM AFTER PROCEDURE: ICD-10-CM

## 2022-05-09 DIAGNOSIS — E89.0 POSTOPERATIVE HYPOTHYROIDISM: ICD-10-CM

## 2022-05-09 DIAGNOSIS — C73 PAPILLARY THYROID CARCINOMA: ICD-10-CM

## 2022-05-09 DIAGNOSIS — E89.2 HYPOPARATHYROIDISM AFTER PROCEDURE: Primary | ICD-10-CM

## 2022-05-09 LAB
ALBUMIN SERPL BCP-MCNC: 4.1 G/DL (ref 3.5–5.2)
ANION GAP SERPL CALC-SCNC: 12 MMOL/L (ref 8–16)
BUN SERPL-MCNC: 10 MG/DL (ref 6–20)
CA-I BLDV-SCNC: 1.13 MMOL/L (ref 1.06–1.42)
CALCIUM SERPL-MCNC: 9.3 MG/DL (ref 8.7–10.5)
CHLORIDE SERPL-SCNC: 102 MMOL/L (ref 95–110)
CO2 SERPL-SCNC: 27 MMOL/L (ref 23–29)
CREAT SERPL-MCNC: 1 MG/DL (ref 0.5–1.4)
EST. GFR  (AFRICAN AMERICAN): >60 ML/MIN/1.73 M^2
EST. GFR  (NON AFRICAN AMERICAN): >60 ML/MIN/1.73 M^2
GLUCOSE SERPL-MCNC: 118 MG/DL (ref 70–110)
PHOSPHATE SERPL-MCNC: 4.2 MG/DL (ref 2.7–4.5)
POTASSIUM SERPL-SCNC: 3.7 MMOL/L (ref 3.5–5.1)
SODIUM SERPL-SCNC: 141 MMOL/L (ref 136–145)
T4 FREE SERPL-MCNC: 1.34 NG/DL (ref 0.71–1.51)
TSH SERPL DL<=0.005 MIU/L-ACNC: 0.66 UIU/ML (ref 0.4–4)

## 2022-05-09 PROCEDURE — 82330 ASSAY OF CALCIUM: CPT | Performed by: STUDENT IN AN ORGANIZED HEALTH CARE EDUCATION/TRAINING PROGRAM

## 2022-05-09 PROCEDURE — 99999 PR PBB SHADOW E&M-EST. PATIENT-LVL IV: CPT | Mod: PBBFAC,,, | Performed by: STUDENT IN AN ORGANIZED HEALTH CARE EDUCATION/TRAINING PROGRAM

## 2022-05-09 PROCEDURE — 84439 ASSAY OF FREE THYROXINE: CPT | Performed by: STUDENT IN AN ORGANIZED HEALTH CARE EDUCATION/TRAINING PROGRAM

## 2022-05-09 PROCEDURE — 84443 ASSAY THYROID STIM HORMONE: CPT | Performed by: STUDENT IN AN ORGANIZED HEALTH CARE EDUCATION/TRAINING PROGRAM

## 2022-05-09 PROCEDURE — 80069 RENAL FUNCTION PANEL: CPT | Performed by: STUDENT IN AN ORGANIZED HEALTH CARE EDUCATION/TRAINING PROGRAM

## 2022-05-09 PROCEDURE — 99214 OFFICE O/P EST MOD 30 MIN: CPT | Mod: PBBFAC | Performed by: STUDENT IN AN ORGANIZED HEALTH CARE EDUCATION/TRAINING PROGRAM

## 2022-05-09 PROCEDURE — 84432 ASSAY OF THYROGLOBULIN: CPT | Performed by: STUDENT IN AN ORGANIZED HEALTH CARE EDUCATION/TRAINING PROGRAM

## 2022-05-09 PROCEDURE — 36415 COLL VENOUS BLD VENIPUNCTURE: CPT | Performed by: STUDENT IN AN ORGANIZED HEALTH CARE EDUCATION/TRAINING PROGRAM

## 2022-05-09 PROCEDURE — 99214 OFFICE O/P EST MOD 30 MIN: CPT | Mod: S$PBB,,, | Performed by: STUDENT IN AN ORGANIZED HEALTH CARE EDUCATION/TRAINING PROGRAM

## 2022-05-09 PROCEDURE — 99214 PR OFFICE/OUTPT VISIT, EST, LEVL IV, 30-39 MIN: ICD-10-PCS | Mod: S$PBB,,, | Performed by: STUDENT IN AN ORGANIZED HEALTH CARE EDUCATION/TRAINING PROGRAM

## 2022-05-09 PROCEDURE — 99999 PR PBB SHADOW E&M-EST. PATIENT-LVL IV: ICD-10-PCS | Mod: PBBFAC,,, | Performed by: STUDENT IN AN ORGANIZED HEALTH CARE EDUCATION/TRAINING PROGRAM

## 2022-05-09 RX ORDER — CALCITRIOL 0.25 UG/1
0.25 CAPSULE ORAL 2 TIMES DAILY
Qty: 60 CAPSULE | Refills: 6 | Status: SHIPPED | OUTPATIENT
Start: 2022-05-09

## 2022-05-09 NOTE — PATIENT INSTRUCTIONS
Here are the instructions on how to complete the 24 hour urine collection:    1) On the day you begin the collection, you will urinate first thing in the morning into the toilet (the first urine is discarded). Write the date and time on the collection label as the start time.  2) Collect all subsequent urine for the next 24 hrs into a urine hat and pour into the 24 hr urine container.  3) At the 24 hour maxine the next day, your final urination will be collected into the container. It should be the first urine of the day and fall at the same time as the start time from the previous morning.   4) After you complete the collection, bring the sample to any [facility] lab for analysis. Results should return in ~2-5 business days.    The urine jug can be stored at room temperature in a cool/dry place for this collection. It does not need to be refrigerated.       Heladio Vargas DO  [facility], 6th Floor  [address] [place], [address]    Office: [phone]  Fax: [phone]

## 2022-05-09 NOTE — ASSESSMENT & PLAN NOTE
2/2 postoperative hypoparathyroidism following total thyroidectomy in 2018  --Suspect PTH will not recover  --Symptomatic despite corrected calcium, suspect symptoms likely due to alternative etiology  --Endorses poor quality of life but tolerable, unable to work due to moderate to sever fatigue   --Unable to get NatPara, stopped Forteo due to s/e     --Corrected calcium has been WNL however phos slightly elevated  --Prior 24 hour Urine Ca elevated, improved with HCTZ     --Currently taking:   -  TUMS 1000 mg (400 elemental) a.m., 500 mg lunch, 1000 mg q.h.s.     -  Calcitriol 0.25 mcg b.i.d.   -  HCTZ 25 mg at bedtime    -  Vit D 1000 IU daily     Plan:   -  Check renal panel, ionized calcium    - If phos elevated decrease calcitriol    - Adjust tums accordingly    - Consider calcium citrate to help with constipation              -  Check 24 hour urine collection     - if elevated consider adding HCTZ 12.5 mg and/or increase HCTZ 25 mg bedtime   -  Baseline DXA w/ radius ordered, never obtained

## 2022-05-09 NOTE — ASSESSMENT & PLAN NOTE
-  S/p total thyroidectomy, [facility], [id number] multifocal PTC - 05/31/2018, with low risk disease on path report  -  No HUNG  -  Very ow level Thyroglobulinemia since surgery , following annually  -  TSH goal 2 or less. Low normal.   -  Thyroid US - 11/27/2019 - s/p Thyroidectomy = no abnormality      PLAN              -  Recheck Thyroglobulin and abs - annually              -  Consider Thyroid US - annually pending Tg              -  TSH to goal 2 or less while avoid overtreatment/Sx

## 2022-05-09 NOTE — ASSESSMENT & PLAN NOTE
--S/p total thyroidectomy  --Biochemically and clinically euthyroid.  --TSH at goal     Current medication:               -  Levothyroxine 88 mcg daily   (name brand) Synthroid 88 mcg daily, at 5:30 AM, take Tums at 9:30AM   Chronically takes with betablocker, instructed on proper administration      Goal = 0.5-2 (h/o PTC)     Plan              -  Continue levothyroxine 88 mcg daily               -  TSH annually or sooner if adjustments

## 2022-06-06 ENCOUNTER — TELEPHONE (OUTPATIENT)
Dept: ENDOCRINOLOGY | Facility: CLINIC | Age: 52
End: 2022-06-06
Payer: OTHER GOVERNMENT

## 2022-06-06 NOTE — TELEPHONE ENCOUNTER
Spoke with patient informed patient she can turn her urine in at any [facility]. Patient verbalized understanding.

## 2022-06-07 ENCOUNTER — LAB VISIT (OUTPATIENT)
Dept: LAB | Facility: HOSPITAL | Age: 52
End: 2022-06-07
Attending: STUDENT IN AN ORGANIZED HEALTH CARE EDUCATION/TRAINING PROGRAM
Payer: OTHER GOVERNMENT

## 2022-06-07 DIAGNOSIS — E89.2 HYPOPARATHYROIDISM AFTER PROCEDURE: ICD-10-CM

## 2022-06-07 LAB
CALCIUM 24H UR-MRATE: 7 MG/HR (ref 4–12)
CALCIUM UR-MCNC: 6.1 MG/DL (ref 0–15)
CALCIUM URINE (MG/SPEC): 156 MG/SPEC
CREAT 24H UR-MRATE: 51.8 MG/HR (ref 40–75)
CREAT UR-MCNC: 48.5 MG/DL (ref 15–325)
CREATININE, URINE (MG/SPEC): 1244 MG/SPEC
URINE COLLECTION DURATION: 24 HR
URINE COLLECTION DURATION: 24 HR
URINE VOLUME: 2565 ML
URINE VOLUME: 2565 ML

## 2022-06-07 PROCEDURE — 82570 ASSAY OF URINE CREATININE: CPT | Performed by: STUDENT IN AN ORGANIZED HEALTH CARE EDUCATION/TRAINING PROGRAM

## 2022-06-07 PROCEDURE — 82340 ASSAY OF CALCIUM IN URINE: CPT | Performed by: STUDENT IN AN ORGANIZED HEALTH CARE EDUCATION/TRAINING PROGRAM

## 2022-06-08 ENCOUNTER — PATIENT MESSAGE (OUTPATIENT)
Dept: ENDOCRINOLOGY | Facility: CLINIC | Age: 52
End: 2022-06-08
Payer: OTHER GOVERNMENT

## 2022-06-14 ENCOUNTER — PATIENT MESSAGE (OUTPATIENT)
Dept: ENDOCRINOLOGY | Facility: CLINIC | Age: 52
End: 2022-06-14
Payer: OTHER GOVERNMENT

## 2022-07-21 ENCOUNTER — PATIENT MESSAGE (OUTPATIENT)
Dept: ENDOCRINOLOGY | Facility: CLINIC | Age: 52
End: 2022-07-21
Payer: OTHER GOVERNMENT

## 2022-11-18 NOTE — TELEPHONE ENCOUNTER
----- Message from Marily Rico sent at 6/7/2019  8:31 AM CDT -----  Contact: self [phone]  .Needs Advice    Reason for call:        Communication Preference:phone    Additional Information:  Abnormal lab value needs to be address please call back to discuss    

## 2023-05-17 ENCOUNTER — PATIENT MESSAGE (OUTPATIENT)
Dept: ENDOCRINOLOGY | Facility: CLINIC | Age: 53
End: 2023-05-17
Payer: OTHER GOVERNMENT

## 2023-05-17 DIAGNOSIS — E89.0 POSTOPERATIVE HYPOTHYROIDISM: ICD-10-CM

## 2023-05-17 DIAGNOSIS — C73 PAPILLARY THYROID CARCINOMA: Primary | ICD-10-CM

## 2023-05-17 DIAGNOSIS — E89.2 HYPOPARATHYROIDISM AFTER PROCEDURE: ICD-10-CM

## 2023-05-22 ENCOUNTER — OFFICE VISIT (OUTPATIENT)
Dept: ENDOCRINOLOGY | Facility: CLINIC | Age: 53
End: 2023-05-22
Payer: OTHER GOVERNMENT

## 2023-05-22 VITALS
HEIGHT: 66 IN | BODY MASS INDEX: 27.97 KG/M2 | WEIGHT: 174.06 LBS | HEART RATE: 61 BPM | SYSTOLIC BLOOD PRESSURE: 114 MMHG | OXYGEN SATURATION: 99 % | DIASTOLIC BLOOD PRESSURE: 76 MMHG

## 2023-05-22 DIAGNOSIS — E89.2 HYPOPARATHYROIDISM AFTER PROCEDURE: ICD-10-CM

## 2023-05-22 DIAGNOSIS — E89.0 POSTOPERATIVE HYPOTHYROIDISM: ICD-10-CM

## 2023-05-22 DIAGNOSIS — C73 PAPILLARY THYROID CARCINOMA: ICD-10-CM

## 2023-05-22 PROCEDURE — 99999 PR PBB SHADOW E&M-EST. PATIENT-LVL V: CPT | Mod: PBBFAC,,, | Performed by: STUDENT IN AN ORGANIZED HEALTH CARE EDUCATION/TRAINING PROGRAM

## 2023-05-22 PROCEDURE — 99214 OFFICE O/P EST MOD 30 MIN: CPT | Mod: S$PBB,,, | Performed by: STUDENT IN AN ORGANIZED HEALTH CARE EDUCATION/TRAINING PROGRAM

## 2023-05-22 PROCEDURE — 99215 OFFICE O/P EST HI 40 MIN: CPT | Mod: PBBFAC | Performed by: STUDENT IN AN ORGANIZED HEALTH CARE EDUCATION/TRAINING PROGRAM

## 2023-05-22 PROCEDURE — 99999 PR PBB SHADOW E&M-EST. PATIENT-LVL V: ICD-10-PCS | Mod: PBBFAC,,, | Performed by: STUDENT IN AN ORGANIZED HEALTH CARE EDUCATION/TRAINING PROGRAM

## 2023-05-22 PROCEDURE — 99214 PR OFFICE/OUTPT VISIT, EST, LEVL IV, 30-39 MIN: ICD-10-PCS | Mod: S$PBB,,, | Performed by: STUDENT IN AN ORGANIZED HEALTH CARE EDUCATION/TRAINING PROGRAM

## 2023-05-22 RX ORDER — BUPROPION HYDROCHLORIDE 150 MG/1
150 TABLET ORAL
COMMUNITY
Start: 2022-10-27

## 2023-05-22 RX ORDER — HYDROCHLOROTHIAZIDE 25 MG/1
12.5 TABLET ORAL DAILY
Qty: 15 TABLET | Refills: 11
Start: 2023-05-22 | End: 2024-05-21

## 2023-05-22 RX ORDER — POTASSIUM CHLORIDE 20 MEQ/1
TABLET, EXTENDED RELEASE ORAL
COMMUNITY
Start: 2023-01-30 | End: 2023-10-26

## 2023-05-22 RX ORDER — ACEBUTOLOL HYDROCHLORIDE 200 MG/1
200 CAPSULE ORAL
COMMUNITY
Start: 2023-05-19

## 2023-05-22 RX ORDER — LANOLIN ALCOHOL/MO/W.PET/CERES
CREAM (GRAM) TOPICAL
COMMUNITY
Start: 2023-05-18

## 2023-05-22 RX ORDER — ESTRADIOL 0.05 MG/D
1 FILM, EXTENDED RELEASE TRANSDERMAL
COMMUNITY
Start: 2023-04-05

## 2023-05-22 NOTE — ASSESSMENT & PLAN NOTE
-  S/p total thyroidectomy, [facility], [id number] multifocal PTC - 05/31/2018, with low risk disease on path report  -  No HUNG  -  Very low level Thyroglobulinemia since surgery , following annually  -  TSH goal 2 or less. Low normal, 0.91 based on labs from 05/19/23.   -  Thyroid US - December 2022 - s/p Thyroidectomy = no abnormality.     Plan:              -  Recheck Thyroglobulin and labs - annually              -  Consider Thyroid US - annually pending Tg level              -  TSH to goal 2 or less while avoid overtreatment/Sx

## 2023-05-22 NOTE — ASSESSMENT & PLAN NOTE
--S/p total thyroidectomy  --Biochemically and clinically euthyroid.  --TSH at goal, now 0.91     Current medication:               -  Levothyroxine 88 mcg daily   (name brand) Synthroid 88 mcg daily, at 5:30 AM, take Tums at 9:30AM      Goal = 0.5-2 (h/o PTC)      Plan:              -  Continue levothyroxine 88 mcg daily               -  TSH annually or sooner if adjustments or symptoms

## 2023-05-22 NOTE — PROGRESS NOTES
ENDOCRINE FOLLOW UP CLINIC VISIT: 05/22/2023    Subjective:      Patient ID: Rosita Romano is a 53 y.o. female.    Chief Complaint:   history of papillary thyroid cancer status post thyroidectomy, with subsequent hypothyroidism and hypoparathyroidism     History of Present Illness  Rosita Romano is a 52 y.o. female with history of papillary thyroid cancer status post thyroidectomy, with subsequent hypothyroidism and hypoparathyroidism complicated by symptomatic hypocalcemia.  She has been followed in the clinic with last visit May of 2022 with Dr. Vargas.  She lives in [place] but is currently in [place] while her  is on a temporary break from working in [place].  She was recently seen at [facility] in [place], [place] due to concerns for palpitations.   She has low normal calcium levels on labs (8.7) and was told she had prolonged QT on EKG.  Heart monitor placed which she continues on at this time.  No here for her yearly follow up.      Last seen 05/09/2022:   -  Lives in [place], was evaluated by endocrinology there, Calcium levels have been mostly at goal if not mildly elevated.  She did complete urine calcium testing as below (see media as well).   Pt reports dissatisfied and inappropriate care from [place] endocrinologist and has been getting some labs through her PCP there.  Prior attempts at applying for and becoming covered on chronic disability have been unsuccessful and are still ongoing.      -  Endorses chronic fatigue, intermittent joint and bone pain, tolerable anxiety/insomina, occasional palpitations, constipation, memory impairment with severe anxiety, chronic joint pains muscle cramps and spasms.  -  Endorses limited functional capacity often times becoming extremely exhausted after cleaning rooms and fatigue limiting routine housework.   -  Has been attempting exercise but she gets spasms of her muscles with this.  She gets heat intolerance as well which limits her.    -  Sleeping and  insomnia issues, fatigue and energy occasionally lead to hypersomnolence.  -  Overall, energy remains poor and interferes with her quality of life preventing her from performing daily tasks adequately    Recent New Concerns:  - Recently she has been having lower BP results into the 100's/60's.  Lowest 90/60s and she feels weak/symptomatic with this.  Beta blocker therapy recently adjusted in the ER and palpitations have since stopped.  Remains on HCTZ 25 mg nightly.  She has had some issues where she had back pain and felt that she may have passed a kidney stone.  Her endocrinologist in [place] did not check her for imaging and dismissed her concerns.  This was a few months ago and a one time occurrence which has resolved.  She is now on Estradiol patch for the hot flashes (Menopasue was in Jan 2018).  Was recently on hydroxyzine and Buspar but has since stopped these after having her episode of palpitations last week.  Has been having issues with itching since last year.  Has itching after exercise and after showers and she has been using baby soap which has not caused as much.  Still not back on Hydralazine.      Postsurgical hypoparathyroidism,   Developed following total thyroidectomy in 2018, subsequent PTH have not recovered.    Reports poor quality of life with myriad of symptoms, including often on muscle spasming, extreme fatigue, brain fog, and anxiety.     Unable to get NatPara due to the current recall  (2019-current)    Did not tolerate Forteo (was on Forteo 20 mcg BID injections on 08/05/2020) - stopped due to joint/bone pain       Current medication:     -  Calcium Citrate 400 mg (approx 84 elemental) three times a day    -  Calcitriol 0.25 mcg b.i.d. (adjusted 12/17)               -  HCTZ 25 mg at bedtime (adjusted 12/17) had low bp with higher dose               -  Vit D 1500 daily combined w/ citrate     Current Symptoms:   -  Denies paresthesias, muscle cramps, muscle spasm, mental fog, polyuria,  "polydipsia, nausea, abdominal discomfort, loss of appetite   -  Endorses muscle weakness, persistent moderate-severe fatigue, intermittent bone pain, intermittent constipation, anxiety                Urine calcium 24 hr:  01/03/2023 (See media)    24 hr Ur Cr: 1.1  Ur Cr: 35.2 mg/dL  Total Vol: 3130 ml    24 hr Ur Ca: 282 mg/24 hr  Ur Calcium: 9.0 mg/dL      Regarding postoperative hypothyroidism:   S/p TT for PTC    Current medication:    Levothyroxine 88 mcg daily 1 tab Mon-Sun    (name brand) Synthroid 88 mcg daily, at 5:30 AM, take Tums at 9:30AM-10AM,     Takes thyroid medication without food first thing in the morning, however takes with betablocker chronically   Has been mostly clinically euthyroid and biochemically at goal       TSH goal of 0.5 - 2      Current symptoms:     -  a denies weight gain, hair loss, brittle nails, mental fog, cold intolerance, compressive symptoms, left femur amiodarone use, edema, hoarseness   -  Endorses fatigue, constipation (off and on), memory impaired      Regarding Papillary thyroid carcinoma     Referred by Dr. John Munoz.    Total thyroidectomy, [facility], [id number] multifocal PTC - 05/31/2018 (see path report in media tab)   Pathology previously reviewed on prior visits, she has low risk disease based on report.    Pt deferred HUNG or remnant ablation, since low risk disease     Thyroglobulin has been undetectable/low and unchanged since surgery.     TSH goal 2 or less. Low normal.       Thyroid US - 11/29/2019 - s/p Thyroidectomy = no abnormality    Thyroid US 12/06/2022 - (see media)                Recent labs from [place]: 01/03/2023 (see media)  TG Ab:  <1.0  TG Quant: 0.3    ROS as above    Objective:   /76 (BP Location: Left arm, Patient Position: Sitting, BP Method: Medium (Manual))   Pulse 61   Ht 5' 6" (1.676 m)   Wt 78.9 kg (174 lb 0.9 oz)   SpO2 99%   BMI 28.09 kg/m²   Physical Exam  Vitals reviewed.   Constitutional:       General: She is not in " acute distress.     Appearance: Normal appearance.   Eyes:      Extraocular Movements: Extraocular movements intact.   Neck:      Comments: Scar noted   Cardiovascular:      Rate and Rhythm: Normal rate and regular rhythm.   Pulmonary:      Effort: Pulmonary effort is normal. No respiratory distress.      Breath sounds: Normal breath sounds.   Musculoskeletal:      Right lower leg: No edema.      Left lower leg: No edema.   Neurological:      Mental Status: She is alert.      Motor: No weakness.     BP Readings from Last 1 Encounters:   05/22/23 114/76      Wt Readings from Last 1 Encounters:   05/22/23 0900 78.9 kg (174 lb 0.9 oz)     Body mass index is 28.09 kg/m².    Lab Review:   No results found for: HGBA1C  No results found for: CHOL, HDL, LDLCALC, TRIG, CHOLHDL  Lab Results   Component Value Date     05/09/2022    K 3.7 05/09/2022     05/09/2022    CO2 27 05/09/2022     (H) 05/09/2022    BUN 10 05/09/2022    CREATININE 1.0 05/09/2022    CALCIUM 9.3 05/09/2022    PROT 7.6 11/27/2020    ALBUMIN 4.1 05/09/2022    BILITOT 0.5 11/27/2020    ALKPHOS 107 11/27/2020    AST 33 11/27/2020    ALT 31 11/27/2020    ANIONGAP 12 05/09/2022    ESTGFRAFRICA >60.0 05/09/2022    EGFRNONAA >60.0 05/09/2022    TSH 0.658 05/09/2022       All pertinent labs reviewed    Assessment and Plan     Problem List Items Addressed This Visit          Oncology    Papillary thyroid carcinoma    Current Assessment & Plan     -  S/p total thyroidectomy, [facility], [id number] multifocal PTC - 05/31/2018, with low risk disease on path report  -  No HUNG  -  Very low level Thyroglobulinemia since surgery , following annually  -  TSH goal 2 or less. Low normal, 0.91 based on labs from 05/19/23.   -  Thyroid US - December 2022 - s/p Thyroidectomy = no abnormality.     Plan:              -  Recheck Thyroglobulin and labs - annually              -  Consider Thyroid US - annually pending Tg level              -  TSH to goal 2 or less  while avoid overtreatment/Sx              Endocrine    Postoperative hypothyroidism    Current Assessment & Plan     --S/p total thyroidectomy  --Biochemically and clinically euthyroid.  --TSH at goal, now 0.91     Current medication:               -  Levothyroxine 88 mcg daily   (name brand) Synthroid 88 mcg daily, at 5:30 AM, take Tums at 9:30AM      Goal = 0.5-2 (h/o PTC)      Plan:              -  Continue levothyroxine 88 mcg daily               -  TSH annually or sooner if adjustments or symptoms         Hypoparathyroidism after procedure    Current Assessment & Plan     2/2 postoperative hypoparathyroidism following total thyroidectomy in 2018  --Suspect PTH will not recover (low detectable values on outside labs recently)  --Symptomatic at times despite corrected calcium, suspect symptoms could be involving an alternative etiology  --Endorses poor quality of life but tolerable, unable to work due to moderate to severe fatigue  --Unable to get NatPara prior, stopped Forteo due to s/e     --Corrected calcium has been WNL however phos slightly elevated at times.  Latest calcium last week was 8.7  --Prior 24 hour Urine Ca was normal in the 280s with use of HCTZ          Plan:   -  Decrease HCTZ to 12.5 mg (1/2 tablet) nightly due to BP issues   -  Check renal panel in 1-2 weeks    - If phos elevated decrease calcitriol    - Adjust Citrcal accordingly                -  Check 24 hour urine collection in 3 months after HCTZ adjustment    - if elevated consider again increasing HCTZ at that time with decrease in other medication such as Beta blocker.     -  Baseline DXA w/ radius previously ordered, never obtained, not fracturing   - Subjective episode of symptoms compatible with kidney stone, not confirmed on imaging prior.         Relevant Medications    hydroCHLOROthiazide (HYDRODIURIL) 25 MG tablet    Other Relevant Orders    RENAL FUNCTION PANEL    Calcium, Timed Urine [facility]; 24 Hours    Creatinine, urine,  timed 24 Hours                              RTC in 1 year with labs before if necessary, if establishes with endocrine in [place] then no need for continued follow up here    Nicholas Manuel DO [facility], [address]  [address] [place], [place], [address]    Office: [phone]  Fax: [phone]    Disclaimer: This note has been generated using voice-recognition software. There may be typographical errors that have been missed during proof-reading.    The above history labs imaging impression and plan were discussed with attending physician who is in agreement and also took part in this patient's care.  I personally reviewed all of the patients available medications, labs, imaging, vitals, allergies, medical history.

## 2023-05-22 NOTE — ASSESSMENT & PLAN NOTE
2/2 postoperative hypoparathyroidism following total thyroidectomy in 2018  --Suspect PTH will not recover (low detectable values on outside labs recently)  --Symptomatic at times despite corrected calcium, suspect symptoms could be involving an alternative etiology  --Endorses poor quality of life but tolerable, unable to work due to moderate to severe fatigue  --Unable to get NatPara prior, stopped Forteo due to s/e     --Corrected calcium has been WNL however phos slightly elevated at times.  Latest calcium last week was 8.7  --Prior 24 hour Urine Ca was normal in the 280s with use of HCTZ          Plan:   -  Decrease HCTZ to 12.5 mg (1/2 tablet) nightly due to BP issues   -  Check renal panel in 1-2 weeks    - If phos elevated decrease calcitriol    - Adjust Citrcal accordingly                -  Check 24 hour urine collection in 3 months after HCTZ adjustment    - if elevated consider again increasing HCTZ at that time with decrease in other medication such as Beta blocker.     -  Baseline DXA w/ radius previously ordered, never obtained, not fracturing   - Subjective episode of symptoms compatible with kidney stone, not confirmed on imaging prior.

## 2023-05-22 NOTE — PATIENT INSTRUCTIONS
We will plan to have you adjust your HCTZ to 12.5 mg once daily.  This will be half a tablet.  We will have you recheck labs in 1-2 weeks to see how you calcium levels are doing on your adjust dose.  We will then need to check a 24 hour urine calcium collection in 3 months and if you are still here you can complete at that time, if not here then you would need to complete this in [place].  You can message us results.  If there is issues with calcium level in the urine being high we can look at adjusting HCTZ back in in combination with decreasing your beta blocker if we are able to.   For now your other medicines are as follows.      Current medication:               -  Calcium Citrate 400 mg (approx 84 elemental) three times a day              -  Calcitriol 0.25 mcg twice a day (adjusted 12/17)                            -  HCTZ 12.5 mg at bedtime (adjusted 05/22/23)                             -  Vit D 1500 daily combined w/ citrate    24 hour Urine study:  This test requires a 24-hour urine sample. For this type of urine sample, you must collect all the urine you produce for 24 hours. Empty your bladder completely first thing in the morning without collecting it and note the time. Then collect your urine every time you go to the bathroom for the next 24 hours.  On the next day urinate into the container when you get up in the morning.  Cap the container and return it as instructed.      Instructions:  For this test, you must urinate into a special container every time you use the bathroom for a 24-hour period.     1. On day 1, urinate into the toilet when you get up in the morning.     2. Afterward, collect all urine in a special container for the next 24 hours.     3. On day 2, urinate into the container when you get up in the morning.     4. Cap the container. Keep it in the refrigerator or a cool place during the collection period.     5. Label the container with your name, the date, the time of completion, and  return it to any [facility] lab.

## 2023-05-31 ENCOUNTER — LAB VISIT (OUTPATIENT)
Dept: LAB | Facility: HOSPITAL | Age: 53
End: 2023-05-31
Attending: STUDENT IN AN ORGANIZED HEALTH CARE EDUCATION/TRAINING PROGRAM
Payer: OTHER GOVERNMENT

## 2023-05-31 DIAGNOSIS — E89.2 HYPOPARATHYROIDISM AFTER PROCEDURE: ICD-10-CM

## 2023-05-31 LAB
ALBUMIN SERPL BCP-MCNC: 4.1 G/DL (ref 3.5–5.2)
ANION GAP SERPL CALC-SCNC: 10 MMOL/L (ref 8–16)
BUN SERPL-MCNC: 11 MG/DL (ref 6–20)
CALCIUM SERPL-MCNC: 9.3 MG/DL (ref 8.7–10.5)
CHLORIDE SERPL-SCNC: 103 MMOL/L (ref 95–110)
CO2 SERPL-SCNC: 27 MMOL/L (ref 23–29)
CREAT SERPL-MCNC: 0.9 MG/DL (ref 0.5–1.4)
EST. GFR  (NO RACE VARIABLE): >60 ML/MIN/1.73 M^2
GLUCOSE SERPL-MCNC: 88 MG/DL (ref 70–110)
PHOSPHATE SERPL-MCNC: 4 MG/DL (ref 2.7–4.5)
POTASSIUM SERPL-SCNC: 3.9 MMOL/L (ref 3.5–5.1)
SODIUM SERPL-SCNC: 140 MMOL/L (ref 136–145)

## 2023-05-31 PROCEDURE — 36415 COLL VENOUS BLD VENIPUNCTURE: CPT | Performed by: STUDENT IN AN ORGANIZED HEALTH CARE EDUCATION/TRAINING PROGRAM

## 2023-05-31 PROCEDURE — 80069 RENAL FUNCTION PANEL: CPT | Performed by: STUDENT IN AN ORGANIZED HEALTH CARE EDUCATION/TRAINING PROGRAM

## 2023-07-13 ENCOUNTER — PATIENT MESSAGE (OUTPATIENT)
Dept: ENDOCRINOLOGY | Facility: CLINIC | Age: 53
End: 2023-07-13
Payer: OTHER GOVERNMENT

## 2023-08-04 ENCOUNTER — PATIENT MESSAGE (OUTPATIENT)
Dept: ENDOCRINOLOGY | Facility: CLINIC | Age: 53
End: 2023-08-04
Payer: OTHER GOVERNMENT

## 2023-08-04 DIAGNOSIS — E89.2 HYPOPARATHYROIDISM AFTER PROCEDURE: Primary | ICD-10-CM

## 2023-08-08 ENCOUNTER — LAB VISIT (OUTPATIENT)
Dept: LAB | Facility: HOSPITAL | Age: 53
End: 2023-08-08
Attending: STUDENT IN AN ORGANIZED HEALTH CARE EDUCATION/TRAINING PROGRAM
Payer: OTHER GOVERNMENT

## 2023-08-08 DIAGNOSIS — E89.2 HYPOPARATHYROIDISM AFTER PROCEDURE: ICD-10-CM

## 2023-08-08 LAB
ALBUMIN SERPL BCP-MCNC: 4.1 G/DL (ref 3.5–5.2)
ANION GAP SERPL CALC-SCNC: 9 MMOL/L (ref 8–16)
BUN SERPL-MCNC: 9 MG/DL (ref 6–20)
CALCIUM SERPL-MCNC: 9.3 MG/DL (ref 8.7–10.5)
CHLORIDE SERPL-SCNC: 102 MMOL/L (ref 95–110)
CO2 SERPL-SCNC: 28 MMOL/L (ref 23–29)
CREAT SERPL-MCNC: 1 MG/DL (ref 0.5–1.4)
EST. GFR  (NO RACE VARIABLE): >60 ML/MIN/1.73 M^2
GLUCOSE SERPL-MCNC: 85 MG/DL (ref 70–110)
MAGNESIUM SERPL-MCNC: 1.9 MG/DL (ref 1.6–2.6)
PHOSPHATE SERPL-MCNC: 4.6 MG/DL (ref 2.7–4.5)
POTASSIUM SERPL-SCNC: 4.2 MMOL/L (ref 3.5–5.1)
SODIUM SERPL-SCNC: 139 MMOL/L (ref 136–145)

## 2023-08-08 PROCEDURE — 36415 COLL VENOUS BLD VENIPUNCTURE: CPT | Performed by: STUDENT IN AN ORGANIZED HEALTH CARE EDUCATION/TRAINING PROGRAM

## 2023-08-08 PROCEDURE — 80069 RENAL FUNCTION PANEL: CPT | Performed by: STUDENT IN AN ORGANIZED HEALTH CARE EDUCATION/TRAINING PROGRAM

## 2023-08-08 PROCEDURE — 83735 ASSAY OF MAGNESIUM: CPT | Performed by: STUDENT IN AN ORGANIZED HEALTH CARE EDUCATION/TRAINING PROGRAM

## 2023-09-06 ENCOUNTER — PATIENT MESSAGE (OUTPATIENT)
Dept: ENDOCRINOLOGY | Facility: CLINIC | Age: 53
End: 2023-09-06
Payer: OTHER GOVERNMENT

## 2023-09-08 DIAGNOSIS — E89.2 HYPOPARATHYROIDISM AFTER PROCEDURE: Primary | ICD-10-CM

## 2023-09-13 ENCOUNTER — LAB VISIT (OUTPATIENT)
Dept: LAB | Facility: HOSPITAL | Age: 53
End: 2023-09-13
Attending: STUDENT IN AN ORGANIZED HEALTH CARE EDUCATION/TRAINING PROGRAM
Payer: OTHER GOVERNMENT

## 2023-09-13 ENCOUNTER — PATIENT MESSAGE (OUTPATIENT)
Dept: ENDOCRINOLOGY | Facility: CLINIC | Age: 53
End: 2023-09-13
Payer: OTHER GOVERNMENT

## 2023-09-13 DIAGNOSIS — E89.0 POSTOPERATIVE HYPOTHYROIDISM: ICD-10-CM

## 2023-09-13 DIAGNOSIS — E89.2 HYPOPARATHYROIDISM AFTER PROCEDURE: ICD-10-CM

## 2023-09-13 DIAGNOSIS — C73 PAPILLARY THYROID CARCINOMA: ICD-10-CM

## 2023-09-13 LAB
ALBUMIN SERPL BCP-MCNC: 4.1 G/DL (ref 3.5–5.2)
ALBUMIN SERPL BCP-MCNC: 4.1 G/DL (ref 3.5–5.2)
ANION GAP SERPL CALC-SCNC: 8 MMOL/L (ref 8–16)
ANION GAP SERPL CALC-SCNC: 8 MMOL/L (ref 8–16)
BUN SERPL-MCNC: 9 MG/DL (ref 6–20)
BUN SERPL-MCNC: 9 MG/DL (ref 6–20)
CALCIUM 24H UR-MRATE: 15 MG/HR (ref 4–12)
CALCIUM SERPL-MCNC: 9.3 MG/DL (ref 8.7–10.5)
CALCIUM SERPL-MCNC: 9.3 MG/DL (ref 8.7–10.5)
CALCIUM UR-MCNC: 15.6 MG/DL (ref 0–15)
CALCIUM URINE (MG/SPEC): 359 MG/SPEC
CHLORIDE SERPL-SCNC: 102 MMOL/L (ref 95–110)
CHLORIDE SERPL-SCNC: 102 MMOL/L (ref 95–110)
CO2 SERPL-SCNC: 30 MMOL/L (ref 23–29)
CO2 SERPL-SCNC: 30 MMOL/L (ref 23–29)
CREAT 24H UR-MRATE: 52.2 MG/HR (ref 40–75)
CREAT SERPL-MCNC: 1 MG/DL (ref 0.5–1.4)
CREAT SERPL-MCNC: 1 MG/DL (ref 0.5–1.4)
CREAT UR-MCNC: 54.5 MG/DL (ref 15–325)
CREATININE, URINE (MG/SPEC): 1253.5 MG/SPEC
EST. GFR  (NO RACE VARIABLE): >60 ML/MIN/1.73 M^2
EST. GFR  (NO RACE VARIABLE): >60 ML/MIN/1.73 M^2
GLUCOSE SERPL-MCNC: 71 MG/DL (ref 70–110)
GLUCOSE SERPL-MCNC: 71 MG/DL (ref 70–110)
MAGNESIUM SERPL-MCNC: 1.7 MG/DL (ref 1.6–2.6)
PHOSPHATE SERPL-MCNC: 4.2 MG/DL (ref 2.7–4.5)
PHOSPHATE SERPL-MCNC: 4.2 MG/DL (ref 2.7–4.5)
POTASSIUM SERPL-SCNC: 3.9 MMOL/L (ref 3.5–5.1)
POTASSIUM SERPL-SCNC: 3.9 MMOL/L (ref 3.5–5.1)
SODIUM SERPL-SCNC: 140 MMOL/L (ref 136–145)
SODIUM SERPL-SCNC: 140 MMOL/L (ref 136–145)
T4 FREE SERPL-MCNC: 1.27 NG/DL (ref 0.71–1.51)
TSH SERPL DL<=0.005 MIU/L-ACNC: 1.03 UIU/ML (ref 0.4–4)
URINE COLLECTION DURATION: 24 HR
URINE COLLECTION DURATION: 24 HR
URINE VOLUME: 2300 ML
URINE VOLUME: 2300 ML

## 2023-09-13 PROCEDURE — 84439 ASSAY OF FREE THYROXINE: CPT | Performed by: STUDENT IN AN ORGANIZED HEALTH CARE EDUCATION/TRAINING PROGRAM

## 2023-09-13 PROCEDURE — 86800 THYROGLOBULIN ANTIBODY: CPT | Performed by: STUDENT IN AN ORGANIZED HEALTH CARE EDUCATION/TRAINING PROGRAM

## 2023-09-13 PROCEDURE — 82340 ASSAY OF CALCIUM IN URINE: CPT | Performed by: STUDENT IN AN ORGANIZED HEALTH CARE EDUCATION/TRAINING PROGRAM

## 2023-09-13 PROCEDURE — 80069 RENAL FUNCTION PANEL: CPT | Performed by: STUDENT IN AN ORGANIZED HEALTH CARE EDUCATION/TRAINING PROGRAM

## 2023-09-13 PROCEDURE — 82570 ASSAY OF URINE CREATININE: CPT | Performed by: STUDENT IN AN ORGANIZED HEALTH CARE EDUCATION/TRAINING PROGRAM

## 2023-09-13 PROCEDURE — 84443 ASSAY THYROID STIM HORMONE: CPT | Performed by: STUDENT IN AN ORGANIZED HEALTH CARE EDUCATION/TRAINING PROGRAM

## 2023-09-13 PROCEDURE — 83735 ASSAY OF MAGNESIUM: CPT | Performed by: STUDENT IN AN ORGANIZED HEALTH CARE EDUCATION/TRAINING PROGRAM

## 2023-09-15 LAB
THRYOGLOBULIN INTERPRETATION: ABNORMAL
THYROGLOB AB SERPL-ACNC: <1.8 IU/ML
THYROGLOB SERPL-MCNC: 0.4 NG/ML

## 2023-09-19 ENCOUNTER — PATIENT MESSAGE (OUTPATIENT)
Dept: ENDOCRINOLOGY | Facility: CLINIC | Age: 53
End: 2023-09-19
Payer: OTHER GOVERNMENT

## 2023-10-16 ENCOUNTER — PATIENT MESSAGE (OUTPATIENT)
Dept: ENDOCRINOLOGY | Facility: CLINIC | Age: 53
End: 2023-10-16
Payer: OTHER GOVERNMENT

## 2023-10-16 ENCOUNTER — TELEPHONE (OUTPATIENT)
Dept: ENDOCRINOLOGY | Facility: CLINIC | Age: 53
End: 2023-10-16
Payer: OTHER GOVERNMENT

## 2023-10-16 DIAGNOSIS — C73 PAPILLARY THYROID CARCINOMA: Primary | ICD-10-CM

## 2023-10-16 NOTE — TELEPHONE ENCOUNTER
----- Message from Rodrigue Tirado sent at 10/16/2023  8:06 AM CDT -----  Regarding: RX Clarification / labs / appt-virtual  Contact: PT @ [phone]  Pt is calling to speak with someone in the office to discuss rx: hydroCHLOROthiazide (HYDRODIURIL) 25 MG tablet and pt is also asking having labs done; trying to lower her calcium. Pt stated that she was having a lot of muscle twitches. Pt is asking to speak with someone and schedule an appt a virtual appt with Dr. Zamora. Please call. Thanks.

## 2023-10-16 NOTE — TELEPHONE ENCOUNTER
----- Message from Rodrigue Tirado sent at 10/16/2023  8:06 AM CDT -----  Regarding: RX Clarification / labs / appt-virtual  Contact: PT @ [phone]  Pt is calling to speak with someone in the office to discuss rx: hydroCHLOROthiazide (HYDRODIURIL) 25 MG tablet and pt is also asking having labs done; trying to lower her calcium. Pt stated that she was having a lot of muscle twitches. Pt is asking to speak with someone and schedule an appt a virtual appt with Dr. Zamora. Please call. Thanks.

## 2023-11-15 ENCOUNTER — PATIENT MESSAGE (OUTPATIENT)
Dept: ENDOCRINOLOGY | Facility: CLINIC | Age: 53
End: 2023-11-15
Payer: OTHER GOVERNMENT

## 2023-11-15 DIAGNOSIS — E89.2 HYPOPARATHYROIDISM AFTER PROCEDURE: Primary | ICD-10-CM

## 2023-11-16 ENCOUNTER — LAB VISIT (OUTPATIENT)
Dept: LAB | Facility: HOSPITAL | Age: 53
End: 2023-11-16
Attending: STUDENT IN AN ORGANIZED HEALTH CARE EDUCATION/TRAINING PROGRAM
Payer: OTHER GOVERNMENT

## 2023-11-16 DIAGNOSIS — E89.2 HYPOPARATHYROIDISM AFTER PROCEDURE: ICD-10-CM

## 2023-11-16 LAB
ALBUMIN SERPL BCP-MCNC: 4 G/DL (ref 3.5–5.2)
ANION GAP SERPL CALC-SCNC: 11 MMOL/L (ref 8–16)
BUN SERPL-MCNC: 10 MG/DL (ref 6–20)
CALCIUM SERPL-MCNC: 9.1 MG/DL (ref 8.7–10.5)
CHLORIDE SERPL-SCNC: 103 MMOL/L (ref 95–110)
CO2 SERPL-SCNC: 27 MMOL/L (ref 23–29)
CREAT SERPL-MCNC: 0.9 MG/DL (ref 0.5–1.4)
EST. GFR  (NO RACE VARIABLE): >60 ML/MIN/1.73 M^2
GLUCOSE SERPL-MCNC: 93 MG/DL (ref 70–110)
MAGNESIUM SERPL-MCNC: 1.8 MG/DL (ref 1.6–2.6)
PHOSPHATE SERPL-MCNC: 4 MG/DL (ref 2.7–4.5)
POTASSIUM SERPL-SCNC: 4 MMOL/L (ref 3.5–5.1)
SODIUM SERPL-SCNC: 141 MMOL/L (ref 136–145)

## 2023-11-16 PROCEDURE — 83735 ASSAY OF MAGNESIUM: CPT | Performed by: STUDENT IN AN ORGANIZED HEALTH CARE EDUCATION/TRAINING PROGRAM

## 2023-11-16 PROCEDURE — 36415 COLL VENOUS BLD VENIPUNCTURE: CPT | Performed by: STUDENT IN AN ORGANIZED HEALTH CARE EDUCATION/TRAINING PROGRAM

## 2023-11-16 PROCEDURE — 80069 RENAL FUNCTION PANEL: CPT | Performed by: STUDENT IN AN ORGANIZED HEALTH CARE EDUCATION/TRAINING PROGRAM
